# Patient Record
Sex: MALE | Race: WHITE | NOT HISPANIC OR LATINO | Employment: FULL TIME | URBAN - METROPOLITAN AREA
[De-identification: names, ages, dates, MRNs, and addresses within clinical notes are randomized per-mention and may not be internally consistent; named-entity substitution may affect disease eponyms.]

---

## 2018-09-17 ENCOUNTER — HOSPITAL ENCOUNTER (EMERGENCY)
Facility: CLINIC | Age: 28
Discharge: HOME | End: 2018-09-17
Attending: EMERGENCY MEDICINE | Admitting: EMERGENCY MEDICINE
Payer: COMMERCIAL

## 2018-09-17 ENCOUNTER — APPOINTMENT (OUTPATIENT)
Dept: GENERAL RADIOLOGY | Facility: CLINIC | Age: 28
End: 2018-09-17
Attending: EMERGENCY MEDICINE
Payer: COMMERCIAL

## 2018-09-17 VITALS
SYSTOLIC BLOOD PRESSURE: 128 MMHG | TEMPERATURE: 97.8 F | WEIGHT: 112.43 LBS | DIASTOLIC BLOOD PRESSURE: 71 MMHG | BODY MASS INDEX: 17.04 KG/M2 | HEIGHT: 68 IN | HEART RATE: 88 BPM | RESPIRATION RATE: 18 BRPM | OXYGEN SATURATION: 98 %

## 2018-09-17 DIAGNOSIS — Q21.3 TETRALOGY OF FALLOT: ICD-10-CM

## 2018-09-17 DIAGNOSIS — R07.9 ACUTE CHEST PAIN: ICD-10-CM

## 2018-09-17 LAB
ALBUMIN SERPL-MCNC: 4.5 G/DL (ref 3.4–5)
ALP SERPL-CCNC: 68 U/L (ref 40–150)
ALT SERPL W P-5'-P-CCNC: 23 U/L (ref 0–70)
ANION GAP SERPL CALCULATED.3IONS-SCNC: 8 MMOL/L (ref 3–14)
AST SERPL W P-5'-P-CCNC: 20 U/L (ref 0–45)
BASOPHILS # BLD AUTO: 0 10E9/L (ref 0–0.2)
BASOPHILS NFR BLD AUTO: 0.4 %
BILIRUB SERPL-MCNC: 0.6 MG/DL (ref 0.2–1.3)
BUN SERPL-MCNC: 13 MG/DL (ref 7–30)
CALCIUM SERPL-MCNC: 8.6 MG/DL (ref 8.5–10.1)
CHLORIDE SERPL-SCNC: 107 MMOL/L (ref 94–109)
CO2 SERPL-SCNC: 26 MMOL/L (ref 20–32)
CREAT SERPL-MCNC: 1.05 MG/DL (ref 0.66–1.25)
DIFFERENTIAL METHOD BLD: NORMAL
EOSINOPHIL # BLD AUTO: 0.1 10E9/L (ref 0–0.7)
EOSINOPHIL NFR BLD AUTO: 1.4 %
ERYTHROCYTE [DISTWIDTH] IN BLOOD BY AUTOMATED COUNT: 12.7 % (ref 10–15)
GFR SERPL CREATININE-BSD FRML MDRD: 86 ML/MIN/1.7M2
GLUCOSE SERPL-MCNC: 95 MG/DL (ref 70–99)
HCT VFR BLD AUTO: 47 % (ref 40–53)
HGB BLD-MCNC: 15.4 G/DL (ref 13.3–17.7)
IMM GRANULOCYTES # BLD: 0 10E9/L (ref 0–0.4)
IMM GRANULOCYTES NFR BLD: 0.1 %
INTERPRETATION ECG - MUSE: NORMAL
LYMPHOCYTES # BLD AUTO: 1.5 10E9/L (ref 0.8–5.3)
LYMPHOCYTES NFR BLD AUTO: 21 %
MCH RBC QN AUTO: 28.5 PG (ref 26.5–33)
MCHC RBC AUTO-ENTMCNC: 32.8 G/DL (ref 31.5–36.5)
MCV RBC AUTO: 87 FL (ref 78–100)
MONOCYTES # BLD AUTO: 0.7 10E9/L (ref 0–1.3)
MONOCYTES NFR BLD AUTO: 9.3 %
NEUTROPHILS # BLD AUTO: 5 10E9/L (ref 1.6–8.3)
NEUTROPHILS NFR BLD AUTO: 67.8 %
NRBC # BLD AUTO: 0 10*3/UL
NRBC BLD AUTO-RTO: 0 /100
PLATELET # BLD AUTO: 198 10E9/L (ref 150–450)
POTASSIUM SERPL-SCNC: 3.8 MMOL/L (ref 3.4–5.3)
PROT SERPL-MCNC: 7 G/DL (ref 6.8–8.8)
RBC # BLD AUTO: 5.41 10E12/L (ref 4.4–5.9)
SODIUM SERPL-SCNC: 141 MMOL/L (ref 133–144)
TROPONIN I SERPL-MCNC: <0.015 UG/L (ref 0–0.04)
WBC # BLD AUTO: 7.3 10E9/L (ref 4–11)

## 2018-09-17 PROCEDURE — 93005 ELECTROCARDIOGRAM TRACING: CPT

## 2018-09-17 PROCEDURE — 99285 EMERGENCY DEPT VISIT HI MDM: CPT | Mod: 25

## 2018-09-17 PROCEDURE — 80053 COMPREHEN METABOLIC PANEL: CPT | Performed by: EMERGENCY MEDICINE

## 2018-09-17 PROCEDURE — 25000132 ZZH RX MED GY IP 250 OP 250 PS 637: Performed by: EMERGENCY MEDICINE

## 2018-09-17 PROCEDURE — 85025 COMPLETE CBC W/AUTO DIFF WBC: CPT | Performed by: EMERGENCY MEDICINE

## 2018-09-17 PROCEDURE — 84484 ASSAY OF TROPONIN QUANT: CPT | Performed by: EMERGENCY MEDICINE

## 2018-09-17 PROCEDURE — 71046 X-RAY EXAM CHEST 2 VIEWS: CPT

## 2018-09-17 RX ORDER — ASPIRIN 81 MG/1
81 TABLET ORAL
COMMUNITY

## 2018-09-17 RX ORDER — ASPIRIN 81 MG/1
324 TABLET, CHEWABLE ORAL ONCE
Status: COMPLETED | OUTPATIENT
Start: 2018-09-17 | End: 2018-09-17

## 2018-09-17 RX ORDER — ATENOLOL 25 MG/1
37.5 TABLET ORAL
COMMUNITY
Start: 2018-03-22

## 2018-09-17 RX ADMIN — ASPIRIN 81 MG 243 MG: 81 TABLET ORAL at 05:30

## 2018-09-17 ASSESSMENT — ENCOUNTER SYMPTOMS
CONSTIPATION: 0
SHORTNESS OF BREATH: 0
VOMITING: 0
DIARRHEA: 0
DYSURIA: 0
ABDOMINAL PAIN: 0
COUGH: 0
HEMATURIA: 0
NAUSEA: 0
FREQUENCY: 0
DIFFICULTY URINATING: 0
APPETITE CHANGE: 0

## 2018-09-17 NOTE — ED PROVIDER NOTES
History     Chief Complaint:  Chest Pain    HPI   Dominic Gibbs is a 27 year old male with a history of tetralogy of Fallot, RBBB who presents to the emergency department for evaluation of chest pain. The patient reports he awoke this morning around 0430 due to left lower chest pain that migrated upwards. He indicates this episode lasted about 1 second but he experienced it 10-15 times, also experienced it in triage. He states he has not experienced it for the last 90 minutes here at the ED. The patient remarks this pain is new and denies no history of the same. He further denies any other symptoms, including shortness of breath, urinary symptoms, bowel symptoms, symptoms with eating. He does note he had a cough when he awoke this morning but that that has since resolved. The patient also reports he had an appointment for his tetralogy of Fallot in March and was told all was normal.    Allergies:  Norcuron     Medications:    Atenolol  Aspirin     Past Medical History:    Tremor  Pulmonary stenosis  RBBB  Reactive airway disease  Scoliosis  Tetralogy of Fallot    Past Surgical History:    Right ventricle to pulmonary artery outflow patch  Back surgery, michael placement for scoliosis  Multiple heart catheterizations  Right ventricle to pulmonary artery conduit change    Family History:    No past pertinent family history.    Social History:  Presents alone.  Never smoker.  Negative for alcohol use.  Marital Status:  Single [1]     Review of Systems   Constitutional: Negative for appetite change.   Respiratory: Negative for cough and shortness of breath.    Cardiovascular: Positive for chest pain.   Gastrointestinal: Negative for abdominal pain, constipation, diarrhea, nausea and vomiting.   Genitourinary: Negative for difficulty urinating, dysuria, frequency and hematuria.   All other systems reviewed and are negative.    Physical Exam     Patient Vitals for the past 24 hrs:   BP Temp Temp src Pulse Resp SpO2 Height  "Weight   09/17/18 0630 128/71 - - - - 98 % - -   09/17/18 0621 127/68 - - - - 99 % - -   09/17/18 0530 130/71 - - - - 100 % - -   09/17/18 0514 126/76 97.8  F (36.6  C) Temporal 88 18 100 % 1.727 m (5' 8\") 51 kg (112 lb 7 oz)       Physical Exam  General: The patient is alert, in no respiratory distress.    HENT: Mucous membranes moist.    Cardiovascular: Regular rate and rhythm. Good pulses in all four extremities. Normal capillary refill and skin turgor. Split S2. Systolic murmur.    Respiratory: Lungs are clear. No nasal flaring. No retractions. No wheezing, no crackles.    Gastrointestinal: Abdomen soft. No guarding, no rebound. No palpable hernias.     Musculoskeletal: No gross deformity.     Skin: No rashes or petechiae.     Neurologic: The patient is alert and oriented x3. GCS 15. No testable cranial nerve deficit. Follows commands with clear and appropriate speech. Gives appropriate answers. Good strength in all extremities. No gross neurologic deficit. Gross sensation intact. Pupils are round and reactive. No meningismus.     Lymphatic: No cervical adenopathy. No lower extremity swelling.    Psychiatric: The patient is non-tearful.    Emergency Department Course   ECG:  Time: 0624  Vent. Rate 71 bpm. NM interval 146. QRS duration 156. QT/QTc 434/471. P-R-T axis 46 93 73. Sinus rhythm with frequent premature ventricular complexes. RBBB. Similar to 2/6/06. Read time: 0626    Imaging:  Radiographic findings were communicated with the patient who voiced understanding of the findings.    XR Chest 2 views:   1. No acute pulmonary abnormality.  2. The inferior most sternal suture has fractured in comparison with  2007. As per radiology.     Laboratory:  CBC: WBC: 7.3, HGB: 15.4, PLT: 198  CMP: all WNL (Creatinine: 1.05)    0528 Troponin I: <0.015    Interventions:  0530 Aspirin 324 mg PO    Emergency Department Course:  Nursing notes and vitals reviewed.     EKG obtained in the ED, see results above.     IV " "inserted. Medicine administered as documented above. Blood drawn. This was sent to the lab for further testing, results above.    The patient was sent for a XR Chest while in the emergency department, findings above.     0630 I performed an exam of the patient as documented above. I disuccused the results of his workup thus far.     Findings and plan explained to the Patient. Patient discharged home with instructions regarding supportive care, medications, and reasons to return. The importance of close follow-up was reviewed.     I personally reviewed the laboratory results with the Patient and answered all related questions prior to discharge.     Impression & Plan      Medical Decision Making:  Dominic Gibbs is a 27 year old male who presents with a complaint of chest pain which is very atypical. He states it \"does not even hurt.\" There is a discrete area near the left nipple line with pain that will travel from the lower chest to the upper chest in one discrete line, and then dissolve. He has no associated symptoms with it. It has started recently. This does not sound consistent with a cardiac cause, though he has a history of tetralogy of Fallot, status post repair. He does not look fluid overloaded. He does have some pulmonary stenosis noted in the chart, but I do not feel this indicates dissection. He did have a broken sternal wire, but I do not feel this was likely the cause behind this. Interestingly, the patient did have an episode while an EKG was performed, and there is a narrow PVCs there, and I wonder if that is what he is sensing. We discussed inflammatory conditions like pleurisy or muscle train are more likely. We discussed PE at length, and the decision was made with the patient, due to his lower risk, of not performing a CT. I also did not feel this was likely dissection. He is otherwise stable. His screen troponin is normal. His EKG does not show signs of significant change, and I think he is " otherwise appropriate to call his cardiologist as an outpatient. He would likely want to do further imaging, echocardiogram, but I will leave further workup to them. I do not believe there is an emergent condition going on currently, or cardiac ischemia. He was discharged home in good condition.     Diagnosis:    ICD-10-CM   1. Acute chest pain R07.9   2. Tetralogy of Fallot Q21.3       Disposition:  discharged to home    I, Alex Feng, am serving as a scribe on 9/17/2018 at 6:26 AM to personally document services performed by Asaf Newsome MD based on my observations and the provider's statements to me.     Alex Feng  9/17/2018   [facility] EMERGENCY DEPARTMENT        Asaf Newsome MD  09/17/18 0916

## 2018-09-17 NOTE — ED TRIAGE NOTES
Pt with CP that woke pt up from sleep.  Hx of ToF as a child, complex cardiac hx.  Denies nausea, endorses mild SOB.

## 2018-09-17 NOTE — ED AVS SNAPSHOT
[facility] Emergency Department    [address]    [facility] [address]    Phone:  [phone]    Fax:  [phone]                                       Dominic Gibbs   MRN: [id number]    Department:  [facility] Emergency Department   Date of Visit:  9/17/2018           After Visit Summary Signature Page     I have received my discharge instructions, and my questions have been answered. I have discussed any challenges I see with this plan with the nurse or doctor.    ..........................................................................................................................................  Patient/Patient Representative Signature      ..........................................................................................................................................  Patient Representative Print Name and Relationship to Patient    ..................................................               ................................................  Date                                   Time    ..........................................................................................................................................  Reviewed by Signature/Title    ...................................................              ..............................................  Date                                               Time          22EPIC Rev 08/18

## 2018-09-17 NOTE — ED AVS SNAPSHOT
[facility] Emergency Department    [address]    [facility] [address]    Phone:  [phone]    Fax:  [phone]                                       Dominic Gibbs   MRN: [id number]    Department:  [facility] Emergency Department   Date of Visit:  9/17/2018           Patient Information     Date Of Birth          1990        Your diagnoses for this visit were:     Acute chest pain     Tetralogy of Fallot        You were seen by Freddie Whitman MD and Asaf Newsome MD.      Follow-up Information     Follow up with your cardiologist. Call today.        Follow up with your doctor. Schedule an appointment as soon as possible for a visit in 3 days.        Discharge Instructions       Discharge Instructions  Chest Pain    You have been seen today for chest pain or discomfort.  At this time, your doctor has found no signs that your chest pain is due to a serious or life-threatening condition, (or you have declined more testing and/or admission to the hospital). However, sometimes there is a serious problem that does not show up right away. Your evaluation today may not be complete and you may need further testing and evaluation.     You need to follow-up with your regular doctor within 3 days.    Return to the Emergency Department if:    Your chest pain changes, gets worse, starts to happen more often, or comes with less activity.    You are short of breath.    You get very weak or tired.    You pass out or faint.    You have any new symptoms, like fever, cough, numb legs, or you cough up blood.    You have anything else that worries you.    Until you follow-up with your regular doctor please do the following:    Take one aspirin daily unless you have an allergy or are told not to by your doctor.    If a stress test appointment has been made, go to the appointment.    If you have questions, contact your regular doctor.    If your doctor today has told you to  follow-up with your regular doctor, it is very important that you make an appointment with your clinic and go to the appointment.  If you do not follow-up with your primary doctor, it may result in missing an important development which could result in permanent injury or disability and/or lasting pain.  If there is any problem keeping your appointment, call your doctor or return to the Emergency Department.    If you were given a prescription for medicine here today, be sure to read all of the information (including the package insert) that comes with your prescription.  This will include important information about the medicine, its side effects, and any warnings that you need to know about.  The pharmacist who fills the prescription can provide more information and answer questions you may have about the medicine.  If you have questions or concerns that the pharmacist cannot address, please call or return to the Emergency Department.     Opioid Medication Information    Pain medications are among the most commonly prescribed medicines, so we are including this information for all our patients. If you did not receive pain medication or get a prescription for pain medicine, you can ignore it.     You may have been given a prescription for an opioid (narcotic) pain medicine and/or have received a pain medicine while here in the Emergency Department. These medicines can make you drowsy or impaired. You must not drive, operate dangerous equipment, or engage in any other dangerous activities while taking these medications. If you drive while taking these medications, you could be arrested for DUI, or driving under the influence. Do not drink any alcohol while you are taking these medications.     Opioid pain medications can cause addiction. If you have a history of chemical dependency of any type, you are at a higher risk of becoming addicted to pain medications.  Only take these prescribed medications to treat your  pain when all other options have been tried. Take it for as short a time and as few doses as possible. Store your pain pills in a secure place, as they are frequently stolen and provide a dangerous opportunity for children or visitors in your house to start abusing these powerful medications. We will not replace any lost or stolen medicine.  As soon as your pain is better, you should flush all your remaining medication.     Many prescription pain medications contain Tylenol  (acetaminophen), including Vicodin , Tylenol #3 , Norco , Lortab , and Percocet .  You should not take any extra pills of Tylenol  if you are using these prescription medications or you can get very sick.  Do not ever take more than 3000 mg of acetaminophen in any 24 hour period.    All opioids tend to cause constipation. Drink plenty of water and eat foods that have a lot of fiber, such as fruits, vegetables, prune juice, apple juice and high fiber cereal.  Take a laxative if you don t move your bowels at least every other day. Miralax , Milk of Magnesia, Colace , or Senna  can be used to keep you regular.      Remember that you can always come back to the Emergency Department if you are not able to see your regular doctor in the amount of time listed above, if you get any new symptoms, or if there is anything that worries you.          24 Hour Appointment Hotline       To make an appointment at any [facility], call [phone] ([phone]). If you don't have a family doctor or clinic, we will help you find one. [facility] are conveniently located to serve the needs of you and your family.             Review of your medicines      Our records show that you are taking the medicines listed below. If these are incorrect, please call your family doctor or clinic.        Dose / Directions Last dose taken    aspirin 81 MG EC tablet   Dose:  81 mg        Take 81 mg by mouth   Refills:  0        atenolol 25 MG tablet   Commonly known  as:  TENORMIN   Dose:  37.5 mg        Take 37.5 mg by mouth   Refills:  0                Procedures and tests performed during your visit     CBC with platelets differential    Cardiac Continuous Monitoring    Comprehensive metabolic panel    EKG 12 lead    Peripheral IV: Standard    Pulse oximetry nursing    Troponin I    XR Chest 2 Views      Orders Needing Specimen Collection     None      Pending Results     Date and Time Order Name Status Description    9/17/2018 0516 EKG 12 lead Preliminary             Pending Culture Results     No orders found from 9/15/2018 to 9/18/2018.            Pending Results Instructions     If you had any lab results that were not finalized at the time of your Discharge, you can call the ED Lab Result RN at [phone]. You will be contacted by this team for any positive Lab results or changes in treatment. The nurses are available 7 days a week from 10A to 6:30P.  You can leave a message 24 hours per day and they will return your call.        Test Results From Your Hospital Stay        9/17/2018  5:33 AM      Component Results     Component Value Ref Range & Units Status    WBC 7.3 4.0 - 11.0 10e9/L Final    RBC Count 5.41 4.4 - 5.9 10e12/L Final    Hemoglobin 15.4 13.3 - 17.7 g/dL Final    Hematocrit 47.0 40.0 - 53.0 % Final    MCV 87 78 - 100 fl Final    MCH 28.5 26.5 - 33.0 pg Final    MCHC 32.8 31.5 - 36.5 g/dL Final    RDW 12.7 10.0 - 15.0 % Final    Platelet Count 198 150 - 450 10e9/L Final    Diff Method Automated Method  Final    % Neutrophils 67.8 % Final    % Lymphocytes 21.0 % Final    % Monocytes 9.3 % Final    % Eosinophils 1.4 % Final    % Basophils 0.4 % Final    % Immature Granulocytes 0.1 % Final    Nucleated RBCs 0 0 /100 Final    Absolute Neutrophil 5.0 1.6 - 8.3 10e9/L Final    Absolute Lymphocytes 1.5 0.8 - 5.3 10e9/L Final    Absolute Monocytes 0.7 0.0 - 1.3 10e9/L Final    Absolute Eosinophils 0.1 0.0 - 0.7 10e9/L Final    Absolute Basophils 0.0 0.0 - 0.2  10e9/L Final    Abs Immature Granulocytes 0.0 0 - 0.4 10e9/L Final    Absolute Nucleated RBC 0.0  Final         9/17/2018  5:55 AM      Component Results     Component Value Ref Range & Units Status    Troponin I ES <0.015 0.000 - 0.045 ug/L Final    The 99th percentile for upper reference range is 0.045 ug/L.  Troponin values   in the range of 0.045 - 0.120 ug/L may be associated with risks of adverse   clinical events.           9/17/2018  5:55 AM      Component Results     Component Value Ref Range & Units Status    Sodium 141 133 - 144 mmol/L Final    Potassium 3.8 3.4 - 5.3 mmol/L Final    Chloride 107 94 - 109 mmol/L Final    Carbon Dioxide 26 20 - 32 mmol/L Final    Anion Gap 8 3 - 14 mmol/L Final    Glucose 95 70 - 99 mg/dL Final    Urea Nitrogen 13 7 - 30 mg/dL Final    Creatinine 1.05 0.66 - 1.25 mg/dL Final    GFR Estimate 86 >60 mL/min/1.7m2 Final    Non  GFR Calc    GFR Estimate If Black >90 >60 mL/min/1.7m2 Final    African American GFR Calc    Calcium 8.6 8.5 - 10.1 mg/dL Final    Bilirubin Total 0.6 0.2 - 1.3 mg/dL Final    Albumin 4.5 3.4 - 5.0 g/dL Final    Protein Total 7.0 6.8 - 8.8 g/dL Final    Alkaline Phosphatase 68 40 - 150 U/L Final    ALT 23 0 - 70 U/L Final    AST 20 0 - 45 U/L Final         9/17/2018  5:50 AM      Narrative     XR CHEST 2 VW 9/17/2018 5:44 AM    HISTORY: Chest pain.    COMPARISON: 8/20/2007    FINDINGS: No airspace consolidation, pleural effusion or pneumothorax.  Thoracolumbar stabilization hardware and mediastinal hardware noted.  In comparison with 2007 the inferior most sternal suture has  fractured.        Impression     IMPRESSION:   1. No acute pulmonary abnormality.  2. The inferior most sternal suture has fractured in comparison with  2007.    NAMITA HOANG MD                Clinical Quality Measure: Blood Pressure Screening     Your blood pressure was checked while you were in the emergency department today. The last reading we obtained was  BP:  "127/68 . Please read the guidelines below about what these numbers mean and what you should do about them.  If your systolic blood pressure (the top number) is less than 120 and your diastolic blood pressure (the bottom number) is less than 80, then your blood pressure is normal. There is nothing more that you need to do about it.  If your systolic blood pressure (the top number) is 120-139 or your diastolic blood pressure (the bottom number) is 80-89, your blood pressure may be higher than it should be. You should have your blood pressure rechecked within a year by a primary care provider.  If your systolic blood pressure (the top number) is 140 or greater or your diastolic blood pressure (the bottom number) is 90 or greater, you may have high blood pressure. High blood pressure is treatable, but if left untreated over time it can put you at risk for heart attack, stroke, or kidney failure. You should have your blood pressure rechecked by a primary care provider within the next 4 weeks.  If your provider in the emergency department today gave you specific instructions to follow-up with your doctor or provider even sooner than that, you should follow that instruction and not wait for up to 4 weeks for your follow-up visit.        Thank you for choosing [facility]       Thank you for choosing [facility] for your care. Our goal is always to provide you with excellent care. Hearing back from our patients is one way we can continue to improve our services. Please take a few minutes to complete the written survey that you may receive in the mail after you visit with us. Thank you!        [employer] Information     [employer] lets you send messages to your doctor, view your test results, renew your prescriptions, schedule appointments and more. To sign up, go to [email or web address] . Click on \"Log in\" on the left side of the screen, which will take you to the Welcome page. Then click on \"Sign up Now\" on the right side of the " page.     You will be asked to enter the access code listed below, as well as some personal information. Please follow the directions to create your username and password.     Your access code is: [email or web address]  Expires: 2018  6:36 AM     Your access code will  in 90 days. If you need help or a new code, please call your [place] clinic or [phone].        Care EveryWhere ID     This is your Care EveryWhere ID. This could be used by other organizations to access your [place] medical records  [id number]        Equal Access to Services     [facility]: Suleiman Parisi, maddie alvarez, greg padilla, rafy jain . So [facility] [phone].    ATENCIÓN: Si habla español, tiene a moreland disposición servicios gratuitos de asistencia lingüística. Llame al [phone].    We comply with applicable federal civil rights laws and [place] laws. We do not discriminate on the basis of race, color, national origin, age, disability, sex, sexual orientation, or gender identity.            After Visit Summary       This is your record. Keep this with you and show to your community pharmacist(s) and doctor(s) at your next visit.

## 2022-02-02 ENCOUNTER — VIRTUAL VISIT (OUTPATIENT)
Dept: UROLOGY | Facility: CLINIC | Age: 32
End: 2022-02-02
Payer: COMMERCIAL

## 2022-02-02 DIAGNOSIS — Z30.09 VASECTOMY EVALUATION: Primary | ICD-10-CM

## 2022-02-02 PROCEDURE — 99203 OFFICE O/P NEW LOW 30 MIN: CPT | Mod: 95 | Performed by: UROLOGY

## 2022-02-02 NOTE — LETTER
2/2/2022       RE: Dominic Gibbs  [address] [place] [address]     Dear Colleague,    Thank you for referring your patient, Dominic Gibbs, to the [facility] at [facility]. Please see a copy of my visit note below.    VASECTOMY CONSULTATION NOTE  DATE OF VISIT: 2/2/2022  [facility]  PATIENT NAME: Dominic Gibbs    YOB: 1990      REASON FOR CONSULTATION: Mr. Dominic Gibbs is a 31 year old year old gentleman who is seen today requesting a vasectomy. He has 0 children - and he wishes to have a vasectomy for birth control. His wife is in agreement with this plan.     PAST MEDICAL HISTORY:   Past Medical History:   Diagnosis Date     Benign essential tremor      Pulmonary stenosis      RBBB      Reactive airway disease      Scoliosis     s/p surgical repair     Tetralogy of Fallot s/p repair        PAST SURGICAL HISTORY:   Past Surgical History:   Procedure Laterality Date      Right ventricle to pulmonary artery outflow patch.  12/1992     BACK SURGERY - Jose placement for scoliosis  2009     Multiple heart catheterizations  1992, 1993, 1997, 8/2012     Right ventricle to pulmonary artery conduit change (with 18 mm Chavez Dacron porcine valved conduit), suture closure of residual VSD  06/2006     Status post 18 mm pulmonary homograft in the right ventricular outflow tract with VSD closure.  04/1994       MEDICATIONS:   Current Outpatient Medications:      aspirin 81 MG EC tablet, Take 81 mg by mouth, Disp: , Rfl:      atenolol (TENORMIN) 25 MG tablet, Take 37.5 mg by mouth, Disp: , Rfl:     ALLERGIES:   Allergies   Allergen Reactions     Norcuron Anaphylaxis       FAMILY HISTORY: No family history on file.    SOCIAL HISTORY:   Social History     Socioeconomic History     Marital status: Single     Spouse name: Not on file     Number of children: Not on file     Years of education: Not on file     Highest education  level: Not on file   Occupational History     Not on file   Tobacco Use     Smoking status: Never Smoker     Smokeless tobacco: Not on file   Substance and Sexual Activity     Alcohol use: No     Drug use: No     Sexual activity: Not on file   Other Topics Concern     Not on file   Social History Narrative     Not on file     Social Determinants of Health     Financial Resource Strain: Not on file   Food Insecurity: Not on file   Transportation Needs: Not on file   Physical Activity: Not on file   Stress: Not on file   Social Connections: Not on file   Intimate Partner Violence: Not on file   Housing Stability: Not on file       PHYSICAL EXAM  Patient is a 31 year old  male   Vitals: There were no vitals taken for this visit.  There is no height or weight on file to calculate BMI.  General Appearance Adult:   Alert, no acute distress, oriented  HENT: throat/mouth:normal, good dentition  Lungs: no respiratory distress, or pursed lip breathing  Heart: No obvious jugular venous distension present  Abdomen: non - distended  Musculoskeltal: extremities normal, no peripheral edema  Skin: no suspicious lesions or rashes  Neuro: Alert, oriented, speech and mentation normal  Psych: affect and mood normal    DIAGNOSIS: Request for sterilization    PLAN: The risks of the procedure as well as expectations for recovery and outcomes were explained in detail to him.  He was counseled on the risks for bleeding infection and pain after the procedure. We discussed the risk of post-vasectomy pain syndrome.  He was instructed to continue to use contraception until he had proven azoospermia on a semen specimen.  This would normally be collected at least 3 months after the procedure. Also discussed the rare, but possible risk of re-canalization of the vas, even after successful vasectomy with sterile semen specimen.  He was instructed to hold all anticoagulants medications for one week prior to the procedure.  It was recommended that he  have someone else drive him home after his vasectomy.  In light of these risks and expectations he would like to proceed.  We are scheduling a vasectomy in the office in the near future.    Pt. Understands:  -1/1000-1/3000 risk of future pregnancy even with perfectly done vasectomy  -vasectomy is a permanent procedure    -he may cryopreserve sperm if he wishes   -1-5% risk of post-vasectomy pain syndrome   -1-5% risk of complication, primarily infection or bleeding  - he needs to have a semen sample that shows no sperm before getting approval for unprotected intercourse.      Thank you for the kind consultation.    Time spent: 15 minutes of which >50% was spent counseling.    Constantin Maria MD   Urology  [facility]  Clinic Phone [phone]      Dominic Gibbs  who is being evaluated via a billable video visit.      How would you like to obtain your AVS? MyChart  If the video visit is dropped, the invitation should be resent by: Send to e-mail at: [email or web address]  Will anyone else be joining your video visit? No    Video-Visit Details    Type of service:  Video Visit    Video Start Time: 3:27 PM    Video End Time:3:34 PM    Originating Location (pt. Location): Home    Distant Location (provider location):  [facility]     Platform used for Video Visit: [employer]

## 2022-02-02 NOTE — PROGRESS NOTES
VASECTOMY CONSULTATION NOTE  DATE OF VISIT: 2/2/2022  [facility]  PATIENT NAME: Dominic Gibbs    YOB: 1990      REASON FOR CONSULTATION: Mr. Dominic Gibbs is a 31 year old year old gentleman who is seen today requesting a vasectomy. He has 0 children - and he wishes to have a vasectomy for birth control. His wife is in agreement with this plan.     PAST MEDICAL HISTORY:   Past Medical History:   Diagnosis Date     Benign essential tremor      Pulmonary stenosis      RBBB      Reactive airway disease      Scoliosis     s/p surgical repair     Tetralogy of Fallot s/p repair        PAST SURGICAL HISTORY:   Past Surgical History:   Procedure Laterality Date      Right ventricle to pulmonary artery outflow patch.  12/1992     BACK SURGERY - Jose placement for scoliosis  2009     Multiple heart catheterizations  1992, 1993, 1997, 8/2012     Right ventricle to pulmonary artery conduit change (with 18 mm Chavez Dacron porcine valved conduit), suture closure of residual VSD  06/2006     Status post 18 mm pulmonary homograft in the right ventricular outflow tract with VSD closure.  04/1994       MEDICATIONS:   Current Outpatient Medications:      aspirin 81 MG EC tablet, Take 81 mg by mouth, Disp: , Rfl:      atenolol (TENORMIN) 25 MG tablet, Take 37.5 mg by mouth, Disp: , Rfl:     ALLERGIES:   Allergies   Allergen Reactions     Norcuron Anaphylaxis       FAMILY HISTORY: No family history on file.    SOCIAL HISTORY:   Social History     Socioeconomic History     Marital status: Single     Spouse name: Not on file     Number of children: Not on file     Years of education: Not on file     Highest education level: Not on file   Occupational History     Not on file   Tobacco Use     Smoking status: Never Smoker     Smokeless tobacco: Not on file   Substance and Sexual Activity     Alcohol use: No     Drug use: No     Sexual activity: Not on file   Other Topics Concern     Not on file   Social History Narrative      Not on file     Social Determinants of Health     Financial Resource Strain: Not on file   Food Insecurity: Not on file   Transportation Needs: Not on file   Physical Activity: Not on file   Stress: Not on file   Social Connections: Not on file   Intimate Partner Violence: Not on file   Housing Stability: Not on file       PHYSICAL EXAM  Patient is a 31 year old  male   Vitals: There were no vitals taken for this visit.  There is no height or weight on file to calculate BMI.  General Appearance Adult:   Alert, no acute distress, oriented  HENT: throat/mouth:normal, good dentition  Lungs: no respiratory distress, or pursed lip breathing  Heart: No obvious jugular venous distension present  Abdomen: non - distended  Musculoskeltal: extremities normal, no peripheral edema  Skin: no suspicious lesions or rashes  Neuro: Alert, oriented, speech and mentation normal  Psych: affect and mood normal    DIAGNOSIS: Request for sterilization    PLAN: The risks of the procedure as well as expectations for recovery and outcomes were explained in detail to him.  He was counseled on the risks for bleeding infection and pain after the procedure. We discussed the risk of post-vasectomy pain syndrome.  He was instructed to continue to use contraception until he had proven azoospermia on a semen specimen.  This would normally be collected at least 3 months after the procedure. Also discussed the rare, but possible risk of re-canalization of the vas, even after successful vasectomy with sterile semen specimen.  He was instructed to hold all anticoagulants medications for one week prior to the procedure.  It was recommended that he have someone else drive him home after his vasectomy.  In light of these risks and expectations he would like to proceed.  We are scheduling a vasectomy in the office in the near future.    Pt. Understands:  -1/1000-1/3000 risk of future pregnancy even with perfectly done vasectomy  -vasectomy is a  permanent procedure    -he may cryopreserve sperm if he wishes   -1-5% risk of post-vasectomy pain syndrome   -1-5% risk of complication, primarily infection or bleeding  - he needs to have a semen sample that shows no sperm before getting approval for unprotected intercourse.      Thank you for the kind consultation.    Time spent: 15 minutes of which >50% was spent counseling.    Constantin Maria MD   Urology  [facility]  Clinic Phone [phone]      Dominic Gibbs  who is being evaluated via a billable video visit.      How would you like to obtain your AVS? MyChart  If the video visit is dropped, the invitation should be resent by: Send to e-mail at: [email or web address]  Will anyone else be joining your video visit? No    Video-Visit Details    Type of service:  Video Visit    Video Start Time: 3:27 PM    Video End Time:3:34 PM    Originating Location (pt. Location): Home    Distant Location (provider location):  [facility]     Platform used for Video Visit: CEL-SCI

## 2022-03-11 ENCOUNTER — HOSPITAL ENCOUNTER (EMERGENCY)
Facility: CLINIC | Age: 32
Discharge: HOME | End: 2022-03-11
Attending: EMERGENCY MEDICINE | Admitting: EMERGENCY MEDICINE
Payer: COMMERCIAL

## 2022-03-11 VITALS
DIASTOLIC BLOOD PRESSURE: 79 MMHG | HEART RATE: 85 BPM | SYSTOLIC BLOOD PRESSURE: 135 MMHG | RESPIRATION RATE: 20 BRPM | TEMPERATURE: 98 F | OXYGEN SATURATION: 98 %

## 2022-03-11 DIAGNOSIS — L03.211 CELLULITIS OF FACE: ICD-10-CM

## 2022-03-11 LAB
ANION GAP SERPL CALCULATED.3IONS-SCNC: 4 MMOL/L (ref 3–14)
BASOPHILS # BLD AUTO: 0 10E3/UL (ref 0–0.2)
BASOPHILS NFR BLD AUTO: 0 %
BUN SERPL-MCNC: 9 MG/DL (ref 7–30)
CALCIUM SERPL-MCNC: 9 MG/DL (ref 8.5–10.1)
CHLORIDE BLD-SCNC: 109 MMOL/L (ref 94–109)
CO2 SERPL-SCNC: 27 MMOL/L (ref 20–32)
CREAT SERPL-MCNC: 1.13 MG/DL (ref 0.66–1.25)
EOSINOPHIL # BLD AUTO: 0.1 10E3/UL (ref 0–0.7)
EOSINOPHIL NFR BLD AUTO: 1 %
ERYTHROCYTE [DISTWIDTH] IN BLOOD BY AUTOMATED COUNT: 12.9 % (ref 10–15)
FLUAV RNA SPEC QL NAA+PROBE: NEGATIVE
FLUBV RNA RESP QL NAA+PROBE: NEGATIVE
GFR SERPL CREATININE-BSD FRML MDRD: 90 ML/MIN/1.73M2
GLUCOSE BLD-MCNC: 94 MG/DL (ref 70–99)
HCT VFR BLD AUTO: 44.4 % (ref 40–53)
HGB BLD-MCNC: 14.3 G/DL (ref 13.3–17.7)
IMM GRANULOCYTES # BLD: 0 10E3/UL
IMM GRANULOCYTES NFR BLD: 0 %
LYMPHOCYTES # BLD AUTO: 0.9 10E3/UL (ref 0.8–5.3)
LYMPHOCYTES NFR BLD AUTO: 9 %
MCH RBC QN AUTO: 27.6 PG (ref 26.5–33)
MCHC RBC AUTO-ENTMCNC: 32.2 G/DL (ref 31.5–36.5)
MCV RBC AUTO: 86 FL (ref 78–100)
MONOCYTES # BLD AUTO: 0.9 10E3/UL (ref 0–1.3)
MONOCYTES NFR BLD AUTO: 9 %
NEUTROPHILS # BLD AUTO: 8.3 10E3/UL (ref 1.6–8.3)
NEUTROPHILS NFR BLD AUTO: 81 %
NRBC # BLD AUTO: 0 10E3/UL
NRBC BLD AUTO-RTO: 0 /100
PLATELET # BLD AUTO: 202 10E3/UL (ref 150–450)
POTASSIUM BLD-SCNC: 4.6 MMOL/L (ref 3.4–5.3)
RBC # BLD AUTO: 5.18 10E6/UL (ref 4.4–5.9)
SARS-COV-2 RNA RESP QL NAA+PROBE: NEGATIVE
SODIUM SERPL-SCNC: 140 MMOL/L (ref 133–144)
WBC # BLD AUTO: 10.3 10E3/UL (ref 4–11)

## 2022-03-11 PROCEDURE — 85004 AUTOMATED DIFF WBC COUNT: CPT | Performed by: EMERGENCY MEDICINE

## 2022-03-11 PROCEDURE — 36415 COLL VENOUS BLD VENIPUNCTURE: CPT | Performed by: EMERGENCY MEDICINE

## 2022-03-11 PROCEDURE — C9803 HOPD COVID-19 SPEC COLLECT: HCPCS

## 2022-03-11 PROCEDURE — 99284 EMERGENCY DEPT VISIT MOD MDM: CPT | Mod: 25

## 2022-03-11 PROCEDURE — 96365 THER/PROPH/DIAG IV INF INIT: CPT

## 2022-03-11 PROCEDURE — 82310 ASSAY OF CALCIUM: CPT | Performed by: EMERGENCY MEDICINE

## 2022-03-11 PROCEDURE — 87636 SARSCOV2 & INF A&B AMP PRB: CPT | Performed by: EMERGENCY MEDICINE

## 2022-03-11 PROCEDURE — 250N000011 HC RX IP 250 OP 636: Performed by: EMERGENCY MEDICINE

## 2022-03-11 RX ORDER — CLINDAMYCIN HCL 300 MG
300 CAPSULE ORAL 3 TIMES DAILY
Qty: 30 CAPSULE | Refills: 0 | Status: SHIPPED | OUTPATIENT
Start: 2022-03-11 | End: 2022-03-21

## 2022-03-11 RX ORDER — MUPIROCIN 20 MG/G
OINTMENT TOPICAL 3 TIMES DAILY
Qty: 1 G | Refills: 0 | Status: SHIPPED | OUTPATIENT
Start: 2022-03-11 | End: 2022-03-16

## 2022-03-11 RX ORDER — CLINDAMYCIN PHOSPHATE 900 MG/50ML
900 INJECTION, SOLUTION INTRAVENOUS ONCE
Status: COMPLETED | OUTPATIENT
Start: 2022-03-11 | End: 2022-03-11

## 2022-03-11 RX ADMIN — CLINDAMYCIN PHOSPHATE 900 MG: 900 INJECTION, SOLUTION INTRAVENOUS at 08:51

## 2022-03-11 ASSESSMENT — ENCOUNTER SYMPTOMS
VOMITING: 0
NAUSEA: 0
SHORTNESS OF BREATH: 0
COUGH: 0
FEVER: 1
WOUND: 1

## 2022-03-11 NOTE — ED TRIAGE NOTES
Went to clinic yesterday for a wound in nose. Diagnosed with suspected MRSA infection. Taking bactrim for it. Was told to come in if it got worse. Now experiencing low grade fevers.

## 2022-03-11 NOTE — DISCHARGE INSTRUCTIONS
Stop taking the Bactrim that you are prescribed and begin taking the clindamycin.    Discharge Instructions  Cellulitis    Cellulitis is an infection of the skin that occurs when bacteria enter the skin.   Symptoms are generally redness, swelling, warmth and pain.  Your infection appeared to be appropriate to treat at home with antibiotics.  However, sometimes your infection may be worse than it seemed at first, or may worsen with time. If you have new or worse symptoms, you may need to be seen again in the Emergency Department or by your primary provider.    Generally, every Emergency Department visit should have a follow-up clinic visit with either a primary or a specialty clinic/provider. Please follow-up as instructed by your emergency provider today.    Return to the Emergency Department if:  The redness, pain, or swelling gets a lot worse.  If the red area was marked, return if it is red significantly beyond the marked area.  You are unable to get your antibiotics, or are vomiting (throwing up) these pills, or you cannot take them.  You are feeling more ill, weak or lightheaded.  You start to run a new fever (temperature >101 F).  Anything else about the infection worries or concerns you.  Treatment:  Start your antibiotics right away, and take them as prescribed. Be sure to finish the whole prescription, even if you are better.  Apply a heating pad, warm packs, or warm water soaks to the infected area for 15 minutes at a time, at least 3 times a day. Do not use a heating pad on your feet or legs if you have diabetes. Do not sleep with a heating pad on, since this can cause burns or skin injury.  Rest your injured area for at least 1-2 days. After that you may start using your extremity again as long as there is not too much pain.   Raise the injured area above the level of your heart as much as possible in the first 1-2 days.  Tylenol  (acetaminophen), Motrin  (ibuprofen), or Advil  (ibuprofen) may help may  help reduce pain and fever and may help you feel more comfortable. Be sure to read and follow the package directions, and ask your provider if you have questions.    If you were given a prescription for medicine here today, be sure to read all of the information (including the package insert) that comes with your prescription.  This will include important information about the medicine, its side effects, and any warnings that you need to know about.  The pharmacist who fills the prescription can provide more information and answer questions you may have about the medicine.  If you have questions or concerns that the pharmacist cannot address, please call or return to the Emergency Department.     Remember that you can always come back to the Emergency Department if you are not able to see your regular provider in the amount of time listed above, if you get any new symptoms, or if there is anything that worries you.

## 2022-03-11 NOTE — ED PROVIDER NOTES
History   Chief Complaint:  Wound Check      HPI   Dominic Gibbs is a 31 year old male who presents for a wound check. Yesterday the patient was seen in an urgent care for three days of an area of pain and swelling to the right side of his nose. At that time his symptoms were attributed to cellulitis and he was prescribed a course of Bactrim and advised to come into the ED for worsening. This morning he started to develop a fever that has been as high as 100.8, prompting him to come into the ED for evaluation. He has not had any discharge from the area. He denies any dental pain, cough, shortness of breath, nausea, or vomiting.     Review of Systems   Constitutional: Positive for fever.   HENT: Negative for dental problem.    Respiratory: Negative for cough and shortness of breath.    Gastrointestinal: Negative for nausea and vomiting.   Skin: Positive for wound.   All other systems reviewed and are negative.      Allergies:  Norcuron     Medications:  Aspirin  Atenolol   Bactrim     Past Medical History:     Benign essential tremor  Pulmonary stenosis   Right bundle branch block   Scoliosis     Past Surgical History:    Right ventricle to pulmonary artery outflow patch  Back surgery  Heart catheterization  Right ventricle to pulmonary artery conduit change, suture closure of residual VSD   Status post 18 mm pulmonary homograft in right ventricular outflow tract with VSD closure     Social History:  The patient presents to the ED alone.     Physical Exam     Patient Vitals for the past 24 hrs:   BP Temp Temp src Pulse Resp SpO2   03/11/22 0945 -- -- -- -- -- 98 %   03/11/22 0900 -- -- -- -- -- 98 %   03/11/22 0753 135/79 98  F (36.7  C) Temporal 85 20 100 %       Physical Exam  Constitutional: Well appearing.  HEENT: Atraumatic.  PERRL.  EOMI. there is an area of swelling and induration in the right nostril and just inferior to the right nostril.  There are 2 areas to come to a hahn without obvious  fluctuance.  No gingival or dental lesions.  No visible abnormality further in the nostril.  No redness or erythema of the nose itself.  Moist mucous membranes.  Neck: Soft.  Supple.   Cardiac: Regular rate and rhythm.  No murmur or rub.  Respiratory: Clear to auscultation bilaterally.  No respiratory distress.   Abdomen: Soft and nontender. Nondistended.  Musculoskeletal: No edema.  Normal range of motion.  Neurologic: Alert and oriented.  Normal tone and bulk.  Normal gait.  Skin: No rashes.  No edema.  Psych: Normal affect.  Normal behavior.    Emergency Department Course     Laboratory:  Labs Ordered and Resulted from Time of ED Arrival to Time of ED Departure   BASIC METABOLIC PANEL - Normal       Result Value    Sodium 140      Potassium 4.6      Chloride 109      Carbon Dioxide (CO2) 27      Anion Gap 4      Urea Nitrogen 9      Creatinine 1.13      Calcium 9.0      Glucose 94      GFR Estimate 90     INFLUENZA A/B & SARS-COV2 PCR MULTIPLEX - Normal    Influenza A PCR Negative      Influenza B PCR Negative      SARS CoV2 PCR Negative     CBC WITH PLATELETS AND DIFFERENTIAL    WBC Count 10.3      RBC Count 5.18      Hemoglobin 14.3      Hematocrit 44.4      MCV 86      MCH 27.6      MCHC 32.2      RDW 12.9      Platelet Count 202      % Neutrophils 81      % Lymphocytes 9      % Monocytes 9      % Eosinophils 1      % Basophils 0      % Immature Granulocytes 0      NRBCs per 100 WBC 0      Absolute Neutrophils 8.3      Absolute Lymphocytes 0.9      Absolute Monocytes 0.9      Absolute Eosinophils 0.1      Absolute Basophils 0.0      Absolute Immature Granulocytes 0.0      Absolute NRBCs 0.0        Emergency Department Course:     Reviewed:  I reviewed nursing notes, vitals and past medical history    Assessments:  0833:  I obtained history and examined the patient as noted above.    I rechecked the patient and explained findings.     Interventions:  0851 Clindamycin 900 mg IV     Disposition:  The patient was  discharged to home.     Impression & Plan   Medical Decision Making:  Dominic Gibbs is a 31-year-old man who is afebrile and hemodynamically stable.  He does have some small areas of induration and erythema without any obvious fluctuance in the nose.  I did inform bedside ultrasound revealed no evidence of a fluid collection.  Lab work-up is noted as above.  Is no leukocytosis.  His vital signs are stable with no fever here.  Discussed potential viral illness as well as not convinced this area of abnormality around the nose would be causing his fever.  Nonetheless he was given IV clindamycin and will switch from clindamycin if there is a developing abscess, however, there is nothing that is drainable at this time, particularly given the area and concern for Comesis.  He is in agreement's plan and was given ENT for follow-up should he have any worsening.  I discussed primary care follow-up.  Return precautions were given and he was in no distress at time of discharge.     Diagnosis:    ICD-10-CM    1. Cellulitis of face  L03.211        Discharge Medications:  Discharge Medication List as of 3/11/2022 10:17 AM      START taking these medications    Details   clindamycin (CLEOCIN) 300 MG capsule Take 1 capsule (300 mg) by mouth 3 times daily for 10 days, Disp-30 capsule, R-0, E-Prescribe      mupirocin (BACTROBAN) 2 % external ointment Apply topically 3 times daily for 5 daysDisp-1 g, [id number]             Scribe Disclosure:  Dany WOODS, am serving as a scribe at 8:33 AM on 3/11/2022 to document services personally performed by Bert Lyon MD based on my observations and the provider's statements to me.         Bert Lyon MD  03/12/22 8620

## 2022-03-27 ENCOUNTER — OFFICE VISIT (OUTPATIENT)
Dept: URGENT CARE | Facility: URGENT CARE | Age: 32
End: 2022-03-27
Payer: COMMERCIAL

## 2022-03-27 VITALS
OXYGEN SATURATION: 98 % | SYSTOLIC BLOOD PRESSURE: 136 MMHG | TEMPERATURE: 98.9 F | HEART RATE: 81 BPM | DIASTOLIC BLOOD PRESSURE: 76 MMHG | RESPIRATION RATE: 16 BRPM

## 2022-03-27 DIAGNOSIS — J02.9 ACUTE PHARYNGITIS, UNSPECIFIED ETIOLOGY: ICD-10-CM

## 2022-03-27 DIAGNOSIS — R50.9 FEVER IN ADULT: ICD-10-CM

## 2022-03-27 DIAGNOSIS — Q21.3 TETRALOGY OF FALLOT: ICD-10-CM

## 2022-03-27 DIAGNOSIS — J02.8 BACTERIAL PHARYNGITIS: Primary | ICD-10-CM

## 2022-03-27 DIAGNOSIS — R05.9 COUGH: ICD-10-CM

## 2022-03-27 DIAGNOSIS — B96.89 BACTERIAL PHARYNGITIS: Primary | ICD-10-CM

## 2022-03-27 LAB
BASOPHILS # BLD AUTO: 0 10E3/UL (ref 0–0.2)
BASOPHILS NFR BLD AUTO: 0 %
DEPRECATED S PYO AG THROAT QL EIA: NEGATIVE
EOSINOPHIL # BLD AUTO: 0.2 10E3/UL (ref 0–0.7)
EOSINOPHIL NFR BLD AUTO: 1 %
ERYTHROCYTE [DISTWIDTH] IN BLOOD BY AUTOMATED COUNT: 12.7 % (ref 10–15)
HCT VFR BLD AUTO: 39.6 % (ref 40–53)
HGB BLD-MCNC: 13 G/DL (ref 13.3–17.7)
LYMPHOCYTES # BLD AUTO: 1.3 10E3/UL (ref 0.8–5.3)
LYMPHOCYTES NFR BLD AUTO: 11 %
MCH RBC QN AUTO: 27.8 PG (ref 26.5–33)
MCHC RBC AUTO-ENTMCNC: 32.8 G/DL (ref 31.5–36.5)
MCV RBC AUTO: 85 FL (ref 78–100)
MONOCYTES # BLD AUTO: 1.5 10E3/UL (ref 0–1.3)
MONOCYTES NFR BLD AUTO: 13 %
MONOCYTES NFR BLD AUTO: NEGATIVE %
NEUTROPHILS # BLD AUTO: 8.7 10E3/UL (ref 1.6–8.3)
NEUTROPHILS NFR BLD AUTO: 75 %
PLATELET # BLD AUTO: 266 10E3/UL (ref 150–450)
RBC # BLD AUTO: 4.68 10E6/UL (ref 4.4–5.9)
WBC # BLD AUTO: 11.6 10E3/UL (ref 4–11)

## 2022-03-27 PROCEDURE — 36415 COLL VENOUS BLD VENIPUNCTURE: CPT | Performed by: PHYSICIAN ASSISTANT

## 2022-03-27 PROCEDURE — 85025 COMPLETE CBC W/AUTO DIFF WBC: CPT | Performed by: PHYSICIAN ASSISTANT

## 2022-03-27 PROCEDURE — 86308 HETEROPHILE ANTIBODY SCREEN: CPT | Performed by: PHYSICIAN ASSISTANT

## 2022-03-27 PROCEDURE — 87651 STREP A DNA AMP PROBE: CPT | Performed by: PHYSICIAN ASSISTANT

## 2022-03-27 PROCEDURE — 99204 OFFICE O/P NEW MOD 45 MIN: CPT | Performed by: PHYSICIAN ASSISTANT

## 2022-03-28 ENCOUNTER — TELEPHONE (OUTPATIENT)
Dept: UROLOGY | Facility: CLINIC | Age: 32
End: 2022-03-28
Payer: COMMERCIAL

## 2022-03-28 LAB — GROUP A STREP BY PCR: NOT DETECTED

## 2022-03-28 NOTE — PROGRESS NOTES
ASSESSMENT/PLAN:     (J02.8,  B96.89) Bacterial pharyngitis  (primary encounter diagnosis)    MDM: Bacterial non-Strep pharyngitis. Persistent sore throat and fever x 1 week in a 31 year old male with a past medical history that includes Tetralogy of Fallot repair, pulmonary stenosis and reactive airway (see below for full past medical history). Patient has had some cough (but describes that as mild and improving). Negative chest x-ray at another facility 2 days ago by patient report. Rapid Strep is negative. Strep PCR is pending. Mildly elevated WBC with left shift. [place] heterophile negative.     Plan: amoxicillin-clavulanate (AUGMENTIN) 875-125 MG         Tablet    -Augmentin  -Follow-up with primary care provider if fever not resolved and if sore throat not improving in 2 days, if not resolved in 1 week, and immediately if any worsening   -Pharyngitis educational handout provided   -All lab results are reviewed with patient and provided in printed form to review with PCP in future       (R05.9) Cough      (J02.9) Acute pharyngitis, unspecified etiology  Plan: Streptococcus A Rapid Screen w/Reflex to PCR -         Clinic Collect, Group A Streptococcus PCR         Throat Swab, CBC with platelets and         differential, Mononucleosis screen      (R50.9) Fever in adult  Plan: CBC with platelets and differential,         Mononucleosis screen    ------------------------------------        SUBJECTIVE:     Dominic Amor a 31 year old male with a past medical history that includes Tetralogy of Fallot repair, pulmonary stenosis and reactive airway (see below for full past medical history),  who presents to  today for evaluation of sore throat  and fever x 1 week duration. He reports fever is only at night. Highest home temperature was 101.4   Patient confirms they are still able to take in good fluids and soft food despite sore throat.     Of note: Patient states he has had a negative chest x-ray at another  facility, has taken 3 negative home Covid-19 test, and has had a negative Covid-19 test at  [facility] [place] (and is awaiting the second step Covid-19 PCR test result)      Associated Symptoms: Cough (that he describes as mild and improving). He reports a few, scattered,  transient hives, only at night, for the past 2 nights. No hives now. No other rash.      Illness Contact: No known close contact Strep, Mono, Covid-19 or other illness exposure       ROS: No associated severe cough,coughing up of blood, blue lips/fingers/toes, shortness of breath, chest pain, wheezing, abdominal pain, nausea, vomiting, diarrhea, severe body aches, severe headaches, rashes (see above regarding few scattered transient hives)/ No acute joint swelling/hear/redness or other acute illness symptoms.       Past Medical History:   Diagnosis Date     Benign essential tremor      Pulmonary stenosis      RBBB      Reactive airway disease      Scoliosis     s/p surgical repair     Tetralogy of Fallot s/p repair        There is no problem list on file for this patient.      Current Outpatient Medications   Medication     aspirin 81 MG EC tablet     atenolol (TENORMIN) 25 MG tablet     MULTIPLE VITAMINS-MINERALS ER PO     No current facility-administered medications for this visit.       Allergies   Allergen Reactions     Norcuron Anaphylaxis        OBJECTIVE:  /76   Pulse 81   Temp 98.9  F (37.2  C) (Tympanic)   Resp 16   SpO2 98%             General appearance: alert and no apparent distress  Skin color is pink and without rash or hives.  HEENT:   Conjunctiva not injected.  Sclera clear.  Left TM is normal: no effusions, no erythema, and normal landmarks.  Right TM is normal: no effusions, no erythema, and normal landmarks.  Nasal mucosa is normal.  Oropharyngeal exam is positive for mild to moderateerythema.  No asymmetry. Uvula is midline. No trismus. Voice is clear. No lesions, adenopathy, plaque or exudate.  Neck is supple, FROM.  No neck stiffness. No adenopathy  CARDIAC: Positive for 3/6 REBECA. Regular rate and rhythm. No extra heart sounds.   RESP: No increased work of breathing. Few scattered rhonchi that clear with coughing.  Lung fields are otherwise clear to ausculation. No rales, rhonchi, or wheezing.  ABDOMEN:  Soft, non-tender, non-distended.  Positive normal bowel sounds.  No HSM or masses.  No suprapubic tenderness.  No CVA tenderness.  NEURO: Alert and oriented.  Normal speech and mentation.  CN II/XII grossly intact.  Gait within normal limits.          LAB:     Results for orders placed or performed in visit on 03/27/22   Streptococcus A Rapid Screen w/Reflex to PCR - Clinic Collect     Status: Normal    Specimen: Throat; Swab   Result Value Ref Range    Group A Strep antigen Negative Negative     Component      Latest Ref Rng & Units 3/27/2022   WBC      4.0 - 11.0 10e3/uL 11.6 (H)   RBC Count      4.40 - 5.90 10e6/uL 4.68   Hemoglobin      13.3 - 17.7 g/dL 13.0 (L)   Hematocrit      40.0 - 53.0 % 39.6 (L)   MCV      78 - 100 fL 85   MCH      26.5 - 33.0 pg 27.8   MCHC      31.5 - 36.5 g/dL 32.8   RDW      10.0 - 15.0 % 12.7   Platelet Count      150 - 450 10e3/uL 266   % Neutrophils      % 75   % Lymphocytes      % 11   % Monocytes      % 13   % Eosinophils      % 1   % Basophils      % 0   Absolute Neutrophils      1.6 - 8.3 10e3/uL 8.7 (H)   Absolute Lymphocytes      0.8 - 5.3 10e3/uL 1.3   Absolute Monocytes      0.0 - 1.3 10e3/uL 1.5 (H)   Absolute Eosinophils      0.0 - 0.7 10e3/uL 0.2   Absolute Basophils      0.0 - 0.2 10e3/uL 0.0        Component      Latest Ref Rng & Units 3/27/2022   Mononucleosis Screen      Negative Negative     Covid-19 PCR--negative 2 days ago at another clinic by patient report     Negative Chest X-Ray at another facility 2 days ago by patient report

## 2022-03-28 NOTE — TELEPHONE ENCOUNTER
[facility] Call Center    Phone Message    May a detailed message be left on voicemail: yes     Reason for Call: Other: Dominic is calling stating that he went to urgent care yesterday 3/27 and that they diagnosed him with a bacterial infection and he is taking amoxicillin-clavulanate (AUGMENTIN) 875-125 MG tablet for 10 days and is wondering if his vasectomy scheduled for 4/4 should be post poned. Please call Dominic to discuss, thank you!     Action Taken: Other: [facility] Uro    Travel Screening: Not Applicable

## 2022-03-28 NOTE — PATIENT INSTRUCTIONS
Patient Education     Pharyngitis:  (Presumed Non-Strep Throat Infection )    You have pharyngitis (sore throat). The healthcare staff may think your sore throat is caused by a bacteria.   This can cause throat pain that is worse when swallowing, aching all over, headache, swollen lymph nodes or glands in the neck, and fever. The infection is contagious. It often spreads by coughing, kissing, or touching others after touching your mouth or nose. An antibiotic is given to treat the infection. Antibiotics are prescribed by doctors to treat bacterial infections, not viral infections.   Home care    Rest at home. Drink plenty of fluids so you won t get dehydrated.    Stay home from work or school for the first 24 hours of taking the antibiotics, or as directed by the healthcare provider. After this time, you won't be contagious. You can then return to work or school if you are feeling better, or as directed by the provider.     Take the antibiotic medicine for the full 10 days, or as directed by the provider, even when you feel better or your symptoms improve. This is very important to make sure the infection is fully treated. It's also important to prevent medicine-resistant germs from growing. It's also the best way to prevent rheumatic fever, which can affect your heart and other parts of the body. If you were given an antibiotic shot, your provider will tell you if more antibiotics are needed.    You may use acetaminophen or ibuprofen to control pain or fever, unless another medicine was prescribed for this. If you have chronic liver or kidney disease or ever had a stomach ulcer or gastrointestinal bleeding, talk with your provider before using these medicines.    Use throat lozenges or a throat-numbing spray to help reduce throat pain. Gargling with warm salt water can also help reduce throat pain. Dissolve 1/2 teaspoon of salt in 1 glass of warm water.     Don t eat salty or spicy foods. These can irritate the  throat.    Follow-up care  Follow up with your healthcare provider or our staff if you don't feel better in 72 hours, or as directed.   When to get medical advice  Call your healthcare provider right away if any of these occur:     Fever of 100.4 F (38 C), or higher, or as directed by your provider    New or worse ear pain, sinus pain, or headache    Painful lumps in the back of neck    Stiff neck    Lymph nodes that get larger or neck swelling    Can t open mouth wide due to throat pain    Signs of dehydration, such as very dark urine or no urine, sunken eyes, dizziness    Noisy breathing    Muffled voice    New rash    Symptoms are worse    New symptoms develop  Call 911  Call 911 if you have any of these symptoms:     Can't swallow, especially saliva, with a lot of drooling    Trouble breathing or wheezing    Feeling faint    Can't talk    Feeling of doom  Prevention  Here are steps you can take to help prevent an infection:    Keep good handwashing habits.    Don t have close contact with people who have sore throats, colds, or other upper respiratory infections.    Don t smoke, and stay away from secondhand smoke.    Stay up to date with all of your vaccines.  [employer] last reviewed this educational content on 2/1/2020 2000-2021 [employer]. All rights reserved. This information is not intended as a substitute for professional medical care. Always follow your healthcare professional's instructions.             Results for orders placed or performed in visit on 03/27/22   Mononucleosis screen     Status: Normal   Result Value Ref Range    Mononucleosis Screen Negative Negative   CBC with platelets and differential     Status: Abnormal   Result Value Ref Range    WBC Count 11.6 (H) 4.0 - 11.0 10e3/uL    RBC Count 4.68 4.40 - 5.90 10e6/uL    Hemoglobin 13.0 (L) 13.3 - 17.7 g/dL    Hematocrit 39.6 (L) 40.0 - 53.0 %    MCV 85 78 - 100 fL    MCH 27.8 26.5 - 33.0 pg    MCHC 32.8 31.5 - 36.5 g/dL     RDW 12.7 10.0 - 15.0 %    Platelet Count 266 150 - 450 10e3/uL    % Neutrophils 75 %    % Lymphocytes 11 %    % Monocytes 13 %    % Eosinophils 1 %    % Basophils 0 %    Absolute Neutrophils 8.7 (H) 1.6 - 8.3 10e3/uL    Absolute Lymphocytes 1.3 0.8 - 5.3 10e3/uL    Absolute Monocytes 1.5 (H) 0.0 - 1.3 10e3/uL    Absolute Eosinophils 0.2 0.0 - 0.7 10e3/uL    Absolute Basophils 0.0 0.0 - 0.2 10e3/uL   Streptococcus A Rapid Screen w/Reflex to PCR - Clinic Collect     Status: Normal    Specimen: Throat; Swab   Result Value Ref Range    Group A Strep antigen Negative Negative   CBC with platelets and differential     Status: Abnormal    Narrative    The following orders were created for panel order CBC with platelets and differential.  Procedure                               Abnormality         Status                     ---------                               -----------         ------                     CBC with platelets and d...[[id number]]  Abnormal            Final result                 Please view results for these tests on the individual orders.

## 2022-03-30 NOTE — TELEPHONE ENCOUNTER
Returned phone call to patient and tried to leave message. [employer] Assistant would not connect my call at this time.     Dinorah Ashby LPN

## 2022-03-30 NOTE — TELEPHONE ENCOUNTER
Pt is calling again, please call Wagner with an update, as he will be on antibiotics past his vasectomy procedure on 4/4/2022, thank you

## 2022-03-31 NOTE — TELEPHONE ENCOUNTER
"    I called and spoke to pt.  He will keep his vas appt for 4-4-22.  Pt states he is feeling better and has stopped his 81mg aspirin.  Pt states he took a tylenol a few days ago.  I informed him that is ok, just no blood thinners.  JACKELINE Woodward CMA            Documentation       Neelima Saha routed conversation to  Clinical Pool 1 hour ago (1:10 PM)     Neelima Saha 1 hour ago (1:10 PM)     AG       Pt called in regards to below, pt stated he will try and disable whatever google assistant is doing so the call can go through - if it can not go through please mychart the pt. Thanks!         Documentation      Sai Dominic M \"Wagner\" [phone]  Neelima Saha 1 hour ago (1:09 PM)     Dinorah Ashby LPN Yesterday (12:39 PM)     JJ       Returned phone call to patient and tried to leave message. Google Assistant would not connect my call at this time.      Dinorah Ashby LPN            Documentation       Anastasiia Adamson routed conversation to  Clinical Pool Yesterday (12:37 PM)     Anastasiia Adamson Yesterday (12:37 PM)     CW       Pt is calling again, please call Wagner with an update, as he will be on antibiotics past his vasectomy procedure on 4/4/2022, thank you         Documentation      Constantin Maria MD Johnson, Jeanette S, LPN 2 days ago         Pernell Copeland,    If he is feeling better, it would probably be safe to complete the vasectomy as scheduled.  However, if he would like to push it back a few weeks we could see if there is a later scheduled vasectomy patient that he could trade with.     Thanks,   Constantin Maria M.D.    Message text      Dinorah Ashby LPN Hinck, Bryan D, MD 3 days ago     QUINTEN    Please Advise,   Thanks!   ~Dinorah     Message text      Debbie Orourke routed conversation to  Clinical Pool 3 days ago     Debbie Orourke 3 days ago     [facility]     Phone Message     May a detailed message be left on voicemail: yes      Reason for Call: Other: " "Dominic is calling stating that he went to urgent care yesterday 3/27 and that they diagnosed him with a bacterial infection and he is taking amoxicillin-clavulanate (AUGMENTIN) 875-125 MG tablet for 10 days and is wondering if his vasectomy scheduled for 4/4 should be post poned. Please call Dominic to discuss, thank you!      Action Taken: Other: UA Uro     Travel Screening: Not Applicable                                                                                                                                                                                                                                                                                                                                                                                                                                                                                                                                                                                                                                                                                          Documentation      Dominic Gibbs M \"Wagner\" [phone]  Debbie Orourke          "

## 2022-03-31 NOTE — TELEPHONE ENCOUNTER
Pt called in regards to below, pt stated he will try and disable whatever google assistant is doing so the call can go through - if it can not go through please mychart the pt. Thanks!

## 2022-04-04 ENCOUNTER — OFFICE VISIT (OUTPATIENT)
Dept: UROLOGY | Facility: CLINIC | Age: 32
End: 2022-04-04
Payer: COMMERCIAL

## 2022-04-04 VITALS
DIASTOLIC BLOOD PRESSURE: 70 MMHG | BODY MASS INDEX: 24.25 KG/M2 | SYSTOLIC BLOOD PRESSURE: 130 MMHG | HEIGHT: 68 IN | WEIGHT: 160 LBS

## 2022-04-04 DIAGNOSIS — Z30.2 ENCOUNTER FOR STERILIZATION: Primary | ICD-10-CM

## 2022-04-04 PROCEDURE — 88302 TISSUE EXAM BY PATHOLOGIST: CPT | Performed by: PATHOLOGY

## 2022-04-04 PROCEDURE — 55250 REMOVAL OF SPERM DUCT(S): CPT | Performed by: UROLOGY

## 2022-04-04 ASSESSMENT — PAIN SCALES - GENERAL: PAINLEVEL: NO PAIN (0)

## 2022-04-04 NOTE — PROGRESS NOTES
OFFICE VASECTOMY OPERATIVE NOTE  [facility]    DATE: 04/04/22  PATIENT: Dominic Gibbs    YOB: 1990    Dominic Gibbs is a 31 year old male.  He has 0 children and he wishes a vasectomy for birth control.  He has read the brochure and he has shaved himself.  I reviewed the vasectomy procedure with him explaining that it would be done with a local anesthetic given just in the location where the vasectomy would be done.  It would be done through incisions with the removal of segments of the vasa, cauterization of the ends, and burying the ends separate with sutures.      Pt. Understands:  -1/1000-1/3000 risk of future pregnancy even with perfectly done vasectomy  -vasectomy is a permanent procedure    -he may cryopreserve sperm if he wishes   -1-5% risk of post-vasectomy pain syndrome   -1-5% risk of complication, primarily infection or bleeding  - he needs to have a semen sample that shows no sperm before getting approval for unprotected intercourse.      Complications such as bleeding, infection, and damage to other tissues in the area were discussed. I recommended that an ice bag be placed on the scrotum off and on tonight to help reduce pain and swelling.      He was reminded that he was not sterile immediately after the vasectomy that it would take at least 20 ejaculations to empty the vas of any remaining sperm.  He was not to provide a semen sample until after the 20th ejaculation and not before 12 weeks after the vas. He was  to fulfill both of those requirements.   He understands it is his responsibility to find out the results of the vas before proceeding with intercourse without birth control protection.  Other items discussed were activity afterwards, returning to work, voluntary physical activity,  resuming sexual activity, clothing to wear, bathing, and care of the vas site and expected changes in the site as healing progresses.  After signing the permit, bilateral vasectomy was done as  described below.     ANESTHESIA: Local    DETAILS OF PROCEDURE: The risks of the procedure were explained in detail to the patient and informed consent was obtained. The patient was placed supine on the procedure table and the penis and scrotum were prepped and draped in the standard sterile fashion. The right vas deferens was isolated and brought up to the skin. 1% lidocaine local anesthesia was used to infiltrate the skin and the spermatic cord. A small incision was created and adventitial tissues were swept away from the vas. A 1 cm segment of the vas was excised and sent for pathology. The proximal and distal lumina of the vas were cauterized and then each segment was tied off in a knuckling-fashion with a 3-0 vicryl suture. Hemostasis was ensured and the segments were released back into the scrotum. Meticulous hemostasis was achieved. The skin was closed with 3-0 chromic suture in a horizontal mattress fashion. Next the left vas was brought to the skin and a vasectomy was performed in the similar fashion. The skin was closed with 3-0 chromic suture in a horizontal mattress fashion.    COMPLICATIONS: None    TAKE HOME MEDICATIONS: Tylenol every 6 hours, PRN    DISMISSAL INSTRUCTIONS:  - Ice pack to scrotum 15 to 20 minutes each hour awake for 36 to 40 hours.  - No strenuous activity or ejaculation for at least 7 days.  - No unprotected sexual activity until proven azoospermia on semen samples at 3 months.  - Referred to patient handout for normal postop expectations and indications to contact nurse or physician.    M.D.: Constantin Maria MD

## 2022-04-04 NOTE — LETTER
4/4/2022       RE: Dominic Gibbs  [address] [place] [address]     Dear Colleague,    Thank you for referring your patient, Dominic Gibbs, to the [facility]. Please see a copy of my visit note below.    OFFICE VASECTOMY OPERATIVE NOTE  [facility]    DATE: 04/04/22  PATIENT: Dominic Gibbs    YOB: 1990    Dominic Gibbs is a 31 year old male.  He has 0 children and he wishes a vasectomy for birth control.  He has read the brochure and he has shaved himself.  I reviewed the vasectomy procedure with him explaining that it would be done with a local anesthetic given just in the location where the vasectomy would be done.  It would be done through incisions with the removal of segments of the vasa, cauterization of the ends, and burying the ends separate with sutures.      Pt. Understands:  -1/1000-1/3000 risk of future pregnancy even with perfectly done vasectomy  -vasectomy is a permanent procedure    -he may cryopreserve sperm if he wishes   -1-5% risk of post-vasectomy pain syndrome   -1-5% risk of complication, primarily infection or bleeding  - he needs to have a semen sample that shows no sperm before getting approval for unprotected intercourse.      Complications such as bleeding, infection, and damage to other tissues in the area were discussed. I recommended that an ice bag be placed on the scrotum off and on tonight to help reduce pain and swelling.      He was reminded that he was not sterile immediately after the vasectomy that it would take at least 20 ejaculations to empty the vas of any remaining sperm.  He was not to provide a semen sample until after the 20th ejaculation and not before 12 weeks after the vas. He was  to fulfill both of those requirements.   He understands it is his responsibility to find out the results of the vas before proceeding with intercourse without birth control  protection.  Other items discussed were activity afterwards, returning to work, voluntary physical activity,  resuming sexual activity, clothing to wear, bathing, and care of the vas site and expected changes in the site as healing progresses.  After signing the permit, bilateral vasectomy was done as described below.     ANESTHESIA: Local    DETAILS OF PROCEDURE: The risks of the procedure were explained in detail to the patient and informed consent was obtained. The patient was placed supine on the procedure table and the penis and scrotum were prepped and draped in the standard sterile fashion. The right vas deferens was isolated and brought up to the skin. 1% lidocaine local anesthesia was used to infiltrate the skin and the spermatic cord. A small incision was created and adventitial tissues were swept away from the vas. A 1 cm segment of the vas was excised and sent for pathology. The proximal and distal lumina of the vas were cauterized and then each segment was tied off in a knuckling-fashion with a 3-0 vicryl suture. Hemostasis was ensured and the segments were released back into the scrotum. Meticulous hemostasis was achieved. The skin was closed with 3-0 chromic suture in a horizontal mattress fashion. Next the left vas was brought to the skin and a vasectomy was performed in the similar fashion. The skin was closed with 3-0 chromic suture in a horizontal mattress fashion.    COMPLICATIONS: None    TAKE HOME MEDICATIONS: Tylenol every 6 hours, PRN    DISMISSAL INSTRUCTIONS:  - Ice pack to scrotum 15 to 20 minutes each hour awake for 36 to 40 hours.  - No strenuous activity or ejaculation for at least 7 days.  - No unprotected sexual activity until proven azoospermia on semen samples at 3 months.  - Referred to patient handout for normal postop expectations and indications to contact nurse or physician.    M.D.: Constantin Maria MD

## 2022-04-04 NOTE — NURSING NOTE
Chief Complaint   Patient presents with     Sterilization     Patient here for a vasectomy      Patient has signed the consent form stating that we will be doing a bilateral vasectomy today and that this is the correct procedure.  I verbally confirmed the patient's identity using two indicators, relevant allergies, and that the correct equipment was available. Patient was cleaned and prepped according to the appropriate policy.  Equipment was prepped in a sterile fashion and MD was informed that patient was ready.    Consent read and signed: Yes  Aspirin/blood thinning products stopped 7 days prior to procedure: Yes  Allergies   Allergen Reactions     Norcuron Anaphylaxis       Physician performed procedure.  After the procedure the patient was instructed to wait approximately three months and at least 30 ejaculations prior to returning a sample to confirm sterility.  The patient was instructed to bring in sample within 3-6 hours post sample ejaculation.  Any additional medications after the procedure were sent to the patient's pharmacy and instructions were given according to company protocol and the performing physician.  The physician performing the procedure prescribed as they felt appropriate.  Patient was also instructed to avoid heavy lifting or strenuous activity for 10-14 days and to ice the scrotum.  Samples from the R and L vas deferens were sent to the lab and an order was placed for future semen analysis.      The following medication was given:     MEDICATION:  Lidocaine 1% Soln  ROUTE: Local Infilitration   SITE: Scrotum   DOSE: 40ml   LOT #: [id number]  : [employer]  EXPIRATION DATE: 07/25  NDC#: 91112-085-82  Was there drug waste? Yes  Amount of drug waste (mL): 20ml .  Reason for waste:  As per MD  Multi-dose vial: Yes    Pina Renner  April 4, 2022

## 2022-04-04 NOTE — PATIENT INSTRUCTIONS
POST VASECTOMY INSTRUCTIONS    1.) If you have any concerns or questions, please contact our office at [phone].     2.) It is okay to take a shower, however, do not soak in water (bath,swimming, hot tub,etc....) until your incision is healed.    3.) You might notice some swelling, mild bruising, and discomfort for several days after your vasectomy. This is to be expected. For at least the next 24 hours, an ice pack should be applied for 20 minutes every hour that you are awake. Ice will help with discomfort and swelling. Do not place directly on the skin.    4.) No intercourse, strenuous activity or exercise for at least 7-10 days, even if you feel fine.    5.) You need to wear good scrotal support while you are healing. We strongly recommend an athletic supporter or a pair of regular briefs that are one size too small. Boxer briefs do not offer enough support.    6.) Tylenol as directed on the bottle is preferred for discomfort. Please avoid any blood thinning products such as ibuprofen and aspirin (Motrin, Advil, Excedrin, Aleve, ect..) for at least the next week.    7.) It is normal to have mild drainage from the incision area for several days. However, please contact our office if you notice: bright red blood that does not stop after three days, increased pain, heat at the incision, red streaks, foul smelling discharge, or if you start to run a fever.     8.) YOU MUST CONTINUE BIRTH CONTROL UNTIL WE CONFIRM YOUR STERILITY.  This process can take up to a year to complete (rare occurrence).     9.) You have been given a form with specimen cup and instructions for your follow up specimen. You will be cleared once we receive ONE negative specimen. If your specimen comes back positive (sperm seen) you will be asked to repeat the test. This does not mean that your vasectomy has failed.

## 2022-04-06 LAB
PATH REPORT.COMMENTS IMP SPEC: NORMAL
PATH REPORT.COMMENTS IMP SPEC: NORMAL
PATH REPORT.FINAL DX SPEC: NORMAL
PATH REPORT.GROSS SPEC: NORMAL
PATH REPORT.MICROSCOPIC SPEC OTHER STN: NORMAL
PATH REPORT.RELEVANT HX SPEC: NORMAL
PHOTO IMAGE: NORMAL

## 2022-04-10 ENCOUNTER — HEALTH MAINTENANCE LETTER (OUTPATIENT)
Age: 32
End: 2022-04-10

## 2022-04-12 ENCOUNTER — NURSE TRIAGE (OUTPATIENT)
Dept: NURSING | Facility: CLINIC | Age: 32
End: 2022-04-12

## 2022-04-12 NOTE — TELEPHONE ENCOUNTER
Nurse Triage SBAR    Is this a 2nd Level Triage?   Yes    Situation:     Some tenderness and some mild pain upon moving through out the day in the cords and incision area of the Vasectomy.     Background:  Vasectomy    Assessment:    Pt reporting, through out the day.  Pt is noticing some swelling in the cord area and the incisional area through out the day.    Also some swelling in the testicle area.  Pt mentions by the end of the day it hurts in the groin area when he is walking around.    So ends up just sitting around and relaxing because it hurts to walk.   Because of the swelling and aggentation in that area when walking around.     No fever, hot sweats, cold sweats, or the chills.   Good urine flow and output.   Incision area looks good.  But can feel a lump or bump in that area when he is applying neosporin to the incision area.   But is concerned about the extra pain and swelling by the end of the day.         Protocol Recommended Disposition:   Would like a call back from the clinic.    Recommendation:   Please call Pt back @ [phone] for further assistance.     Route to clinic for review and a call back to Pt for further assistance/answering questions.   Thank you     Brandi Cedillo RN  Central Triage Red Flags/Med Refills        Reason for Disposition    Patient wants to be seen    Additional Information    Negative: Sounds like a life-threatening emergency to the triager    Negative: Followed a genital area injury    Negative: Pain or burning with passing urine (urination) is main symptom    Negative: Pain in scrotum or testicle is main symptom    Negative: Swollen scrotum OR lump in the scrotum/groin area    Negative: Pubic lice suspected    Negative: STD exposure and prevention, question about    Negative: SEVERE pain (e.g., excruciating)    Negative: Large amount of blood from end of penis    Negative: Foreskin pulled back and stuck (not circumcised)    Negative: Fever > 100.4 F (38.0 C)     Negative: Unable to urinate (or only a few drops) and bladder feels very full    Negative: Prolonged unwanted erection (i.e., not related to sexual interest) and lasting > 4 hours    Negative: Patient sounds very sick or weak to the triager    Negative: Entire penis is swollen (i.e., edema)    Negative: Looks infected (e.g., draining sore, spreading redness)    Negative: Pain or burning with passing urine (urination)    Negative: Blood in urine (red, pink, or tea-colored)    Negative: Pus (white, yellow) or bloody discharge from end of penis    Negative: Swollen foreskin (not circumcised)    Negative: Tiny water blisters rash, 3 or more    Negative: Antibiotic treatment > 3 days for STD (e.g., penile discharge from gonorrhea, chlamydia) and painful urination not improved    Protocols used: PENIS AND SCROTUM SYMPTOMS-A-[place]

## 2022-04-12 NOTE — TELEPHONE ENCOUNTER
Spoke to patient he states he is concerned about the pain and swelling of site . 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 Instructed to take OTC  Pain medications,ce and support and rest . Will have him come in tomorrow at 3 for Dr Maria to do brief exam .

## 2022-04-13 ENCOUNTER — OFFICE VISIT (OUTPATIENT)
Dept: UROLOGY | Facility: CLINIC | Age: 32
End: 2022-04-13
Payer: COMMERCIAL

## 2022-04-13 VITALS
DIASTOLIC BLOOD PRESSURE: 70 MMHG | WEIGHT: 160 LBS | HEIGHT: 68 IN | HEART RATE: 78 BPM | SYSTOLIC BLOOD PRESSURE: 110 MMHG | BODY MASS INDEX: 24.25 KG/M2 | OXYGEN SATURATION: 97 %

## 2022-04-13 DIAGNOSIS — Z98.52 S/P VASECTOMY: Primary | ICD-10-CM

## 2022-04-13 PROCEDURE — 99024 POSTOP FOLLOW-UP VISIT: CPT | Performed by: UROLOGY

## 2022-04-13 ASSESSMENT — PAIN SCALES - GENERAL: PAINLEVEL: MILD PAIN (2)

## 2022-04-13 NOTE — NURSING NOTE
Chief Complaint   Patient presents with     Vasectomy      Post op incision check   Patient states his incision has some swelling yet and discomfort.  Angeline Tavares LPN

## 2022-04-13 NOTE — LETTER
"4/13/2022       RE: Dominic Gibbs  [address] [place] [address]     Dear Colleague,    Thank you for referring your patient, Dominic Gibbs, to the [facility] at [facility]. Please see a copy of my visit note below.    AKIN   CHIEF COMPLAINT   It was my pleasure to see Dominic Gibbs who is a 31 year old male for follow-up of s/p vasectomy.      HPI   Dominic Gibbs is a very pleasant 31 year old male who is status post a vasectomy on 4/4/2022.  He noted some cord pain and small amount of bruising and presents for evaluation.    PHYSICAL EXAM  Patient is a 31 year old  male   Vitals: Blood pressure 110/70, pulse 78, height 1.727 m (5' 8\"), weight 72.6 kg (160 lb), SpO2 97 %.  Body mass index is 24.33 kg/m .  General Appearance Adult:   Alert, no acute distress, oriented  HENT: throat/mouth:normal, good dentition  Lungs: no respiratory distress, or pursed lip breathing  Heart: No obvious jugular venous distension present  Abdomen: soft, nontender, no organomegaly or masses  Musculoskeltal: extremities normal, no peripheral edema  Skin: no suspicious lesions or rashes  Neuro: Alert, oriented, speech and mentation normal  Psych: affect and mood normal  Gait: Normal  : Small amount of bruising around the incision sites.  No evidence of infection.  Normal testes, no palpable hematoma      ASSESSMENT and PLAN  31-year-old man who is status post a vasectomy on 4/4/2022    Status post vasectomy  -We discussed that he is recovering as expected  -Reassurance provided  -Recommend continued scrotal support, intermittent ice, and Tylenol and ibuprofen  -He may follow-up on a as needed basis      Time spent: 15 minutes spent on the date of the encounter doing chart review, history and exam, documentation and further activities as noted above.    Constantin Maria MD   Urology  [facility]  [facility] Phone: [phone]  [facility] Phone: [phone]

## 2022-04-13 NOTE — PROGRESS NOTES
"[facility]   CHIEF COMPLAINT   It was my pleasure to see Dominic Gibbs who is a 31 year old male for follow-up of s/p vasectomy.      HPI   Dominic Gibbs is a very pleasant 31 year old male who is status post a vasectomy on 4/4/2022.  He noted some cord pain and small amount of bruising and presents for evaluation.    PHYSICAL EXAM  Patient is a 31 year old  male   Vitals: Blood pressure 110/70, pulse 78, height 1.727 m (5' 8\"), weight 72.6 kg (160 lb), SpO2 97 %.  Body mass index is 24.33 kg/m .  General Appearance Adult:   Alert, no acute distress, oriented  HENT: throat/mouth:normal, good dentition  Lungs: no respiratory distress, or pursed lip breathing  Heart: No obvious jugular venous distension present  Abdomen: soft, nontender, no organomegaly or masses  Musculoskeltal: extremities normal, no peripheral edema  Skin: no suspicious lesions or rashes  Neuro: Alert, oriented, speech and mentation normal  Psych: affect and mood normal  Gait: Normal  : Small amount of bruising around the incision sites.  No evidence of infection.  Normal testes, no palpable hematoma      ASSESSMENT and PLAN  31-year-old man who is status post a vasectomy on 4/4/2022    Status post vasectomy  -We discussed that he is recovering as expected  -Reassurance provided  -Recommend continued scrotal support, intermittent ice, and Tylenol and ibuprofen  -He may follow-up on a as needed basis      Time spent: 15 minutes spent on the date of the encounter doing chart review, history and exam, documentation and further activities as noted above.    Constantin Maria MD   Urology  [facility]  [facility] Phone: [phone]  [facility] Phone: [phone]      "

## 2022-07-15 ENCOUNTER — LAB (OUTPATIENT)
Dept: LAB | Facility: CLINIC | Age: 32
End: 2022-07-15
Payer: COMMERCIAL

## 2022-07-15 DIAGNOSIS — Z30.2 ENCOUNTER FOR STERILIZATION: ICD-10-CM

## 2022-07-15 LAB
SEMEN ANALYSIS P VAS PNL: NORMAL
SPERM MOTILE SMN QL MICRO: NORMAL

## 2022-07-15 PROCEDURE — 89321 SEMEN ANAL SPERM DETECTION: CPT

## 2022-07-16 ENCOUNTER — OFFICE VISIT (OUTPATIENT)
Dept: URGENT CARE | Facility: URGENT CARE | Age: 32
End: 2022-07-16
Payer: COMMERCIAL

## 2022-07-16 ENCOUNTER — APPOINTMENT (OUTPATIENT)
Dept: CT IMAGING | Facility: CLINIC | Age: 32
End: 2022-07-16
Attending: EMERGENCY MEDICINE
Payer: COMMERCIAL

## 2022-07-16 ENCOUNTER — HOSPITAL ENCOUNTER (EMERGENCY)
Facility: CLINIC | Age: 32
Discharge: HOME | End: 2022-07-16
Attending: EMERGENCY MEDICINE | Admitting: EMERGENCY MEDICINE
Payer: COMMERCIAL

## 2022-07-16 VITALS
DIASTOLIC BLOOD PRESSURE: 71 MMHG | RESPIRATION RATE: 18 BRPM | SYSTOLIC BLOOD PRESSURE: 134 MMHG | HEART RATE: 52 BPM | TEMPERATURE: 97.6 F | OXYGEN SATURATION: 100 %

## 2022-07-16 VITALS
TEMPERATURE: 97.6 F | SYSTOLIC BLOOD PRESSURE: 112 MMHG | HEART RATE: 75 BPM | DIASTOLIC BLOOD PRESSURE: 76 MMHG | OXYGEN SATURATION: 100 %

## 2022-07-16 DIAGNOSIS — M54.50 BILATERAL LOW BACK PAIN WITHOUT SCIATICA, UNSPECIFIED CHRONICITY: ICD-10-CM

## 2022-07-16 DIAGNOSIS — R19.7 DIARRHEA, UNSPECIFIED TYPE: ICD-10-CM

## 2022-07-16 DIAGNOSIS — R10.13 EPIGASTRIC PAIN: ICD-10-CM

## 2022-07-16 DIAGNOSIS — R10.32 ABDOMINAL PAIN, LEFT LOWER QUADRANT: ICD-10-CM

## 2022-07-16 DIAGNOSIS — R68.81 EARLY SATIETY: ICD-10-CM

## 2022-07-16 DIAGNOSIS — R19.5 LOOSE STOOLS: Primary | ICD-10-CM

## 2022-07-16 LAB
ALBUMIN SERPL-MCNC: 4.4 G/DL (ref 3.4–5)
ALBUMIN UR-MCNC: NEGATIVE MG/DL
ALP SERPL-CCNC: 79 U/L (ref 40–150)
ALT SERPL W P-5'-P-CCNC: 32 U/L (ref 0–70)
ANION GAP SERPL CALCULATED.3IONS-SCNC: 5 MMOL/L (ref 3–14)
APPEARANCE UR: CLEAR
AST SERPL W P-5'-P-CCNC: 20 U/L (ref 0–45)
BASOPHILS # BLD AUTO: 0 10E3/UL (ref 0–0.2)
BASOPHILS NFR BLD AUTO: 1 %
BILIRUB SERPL-MCNC: 1 MG/DL (ref 0.2–1.3)
BILIRUB UR QL STRIP: NEGATIVE
BUN SERPL-MCNC: 12 MG/DL (ref 7–30)
CALCIUM SERPL-MCNC: 9.2 MG/DL (ref 8.5–10.1)
CHLORIDE BLD-SCNC: 106 MMOL/L (ref 94–109)
CO2 SERPL-SCNC: 28 MMOL/L (ref 20–32)
COLOR UR AUTO: ABNORMAL
CREAT SERPL-MCNC: 0.87 MG/DL (ref 0.66–1.25)
EOSINOPHIL # BLD AUTO: 0.7 10E3/UL (ref 0–0.7)
EOSINOPHIL NFR BLD AUTO: 9 %
ERYTHROCYTE [DISTWIDTH] IN BLOOD BY AUTOMATED COUNT: 13.4 % (ref 10–15)
GFR SERPL CREATININE-BSD FRML MDRD: >90 ML/MIN/1.73M2
GLUCOSE BLD-MCNC: 95 MG/DL (ref 70–99)
GLUCOSE UR STRIP-MCNC: NEGATIVE MG/DL
HCT VFR BLD AUTO: 50.5 % (ref 40–53)
HGB BLD-MCNC: 15.7 G/DL (ref 13.3–17.7)
HGB UR QL STRIP: NEGATIVE
IMM GRANULOCYTES # BLD: 0 10E3/UL
IMM GRANULOCYTES NFR BLD: 0 %
KETONES UR STRIP-MCNC: NEGATIVE MG/DL
LEUKOCYTE ESTERASE UR QL STRIP: NEGATIVE
LIPASE SERPL-CCNC: 83 U/L (ref 73–393)
LYMPHOCYTES # BLD AUTO: 1.4 10E3/UL (ref 0.8–5.3)
LYMPHOCYTES NFR BLD AUTO: 19 %
MCH RBC QN AUTO: 27.5 PG (ref 26.5–33)
MCHC RBC AUTO-ENTMCNC: 31.1 G/DL (ref 31.5–36.5)
MCV RBC AUTO: 88 FL (ref 78–100)
MONOCYTES # BLD AUTO: 0.6 10E3/UL (ref 0–1.3)
MONOCYTES NFR BLD AUTO: 9 %
NEUTROPHILS # BLD AUTO: 4.6 10E3/UL (ref 1.6–8.3)
NEUTROPHILS NFR BLD AUTO: 62 %
NITRATE UR QL: NEGATIVE
NRBC # BLD AUTO: 0 10E3/UL
NRBC BLD AUTO-RTO: 0 /100
PH UR STRIP: 7.5 [PH] (ref 5–7)
PLATELET # BLD AUTO: 218 10E3/UL (ref 150–450)
POTASSIUM BLD-SCNC: 4.1 MMOL/L (ref 3.4–5.3)
PROT SERPL-MCNC: 7.3 G/DL (ref 6.8–8.8)
RBC # BLD AUTO: 5.71 10E6/UL (ref 4.4–5.9)
RBC URINE: 0 /HPF
SODIUM SERPL-SCNC: 139 MMOL/L (ref 133–144)
SP GR UR STRIP: 1.03 (ref 1–1.03)
UROBILINOGEN UR STRIP-MCNC: NORMAL MG/DL
WBC # BLD AUTO: 7.4 10E3/UL (ref 4–11)
WBC URINE: 0 /HPF

## 2022-07-16 PROCEDURE — 36415 COLL VENOUS BLD VENIPUNCTURE: CPT | Performed by: EMERGENCY MEDICINE

## 2022-07-16 PROCEDURE — 250N000011 HC RX IP 250 OP 636: Performed by: EMERGENCY MEDICINE

## 2022-07-16 PROCEDURE — 83690 ASSAY OF LIPASE: CPT | Performed by: EMERGENCY MEDICINE

## 2022-07-16 PROCEDURE — 99207 PR NO CHARGE LOS: CPT | Performed by: FAMILY MEDICINE

## 2022-07-16 PROCEDURE — 99285 EMERGENCY DEPT VISIT HI MDM: CPT | Mod: 25

## 2022-07-16 PROCEDURE — 74177 CT ABD & PELVIS W/CONTRAST: CPT

## 2022-07-16 PROCEDURE — 81001 URINALYSIS AUTO W/SCOPE: CPT | Performed by: EMERGENCY MEDICINE

## 2022-07-16 PROCEDURE — 85025 COMPLETE CBC W/AUTO DIFF WBC: CPT | Performed by: EMERGENCY MEDICINE

## 2022-07-16 PROCEDURE — 80053 COMPREHEN METABOLIC PANEL: CPT | Performed by: EMERGENCY MEDICINE

## 2022-07-16 RX ORDER — IOPAMIDOL 755 MG/ML
75 INJECTION, SOLUTION INTRAVASCULAR ONCE
Status: COMPLETED | OUTPATIENT
Start: 2022-07-16 | End: 2022-07-16

## 2022-07-16 RX ADMIN — IOPAMIDOL 75 ML: 755 INJECTION, SOLUTION INTRAVENOUS at 10:58

## 2022-07-16 NOTE — PROGRESS NOTES
THIS IS A TRIAGE ENCOUNTER.      Dominic Gibbs is a 31 year old male presenting with a chief complaint of five days of suddent-onset pain at the bilateral lower back (waxes and wanes, dull and achy), left lower abdomen, upper midline of the abdomen (dull and achy, waxe and wanes), bloating sensation, decreased appetite, loose non-bloody stools, early satiety.  In the mornings, patient has an urgent need to defecate and loose stools.  The stools have not been beige/white.  He has had both constipation and loose stools in the same 24-hour period.      Given the red flag signs such as early satiety and urgent morning defecation, patient will go to an emergency room for further evaluation. I told the patient that I would not charge for this brief triage evaluation.     Rommel Weiner MD

## 2022-07-16 NOTE — ED TRIAGE NOTES
Pt presents with abdominal pain that is generalized since Monday or Tuesday. Was seen at urgent care and referred to ER. Diarrhea/bloating present for same amount of time.     Triage Assessment     Row Name 07/16/22 1005       Triage Assessment (Adult)    Airway WDL WDL       Cognitive/Neuro/Behavioral WDL    Cognitive/Neuro/Behavioral WDL WDL

## 2022-07-16 NOTE — ED PROVIDER NOTES
History     Chief Complaint:  Abdominal Pain      HPI   Dominic Gibbs is a 31 year old male who presents with abdominal pain.  Patient reports onset of abdominal bloating and left lower quadrant discomfort for about 5 days prior to presentation.  He reports that the diarrhea, waxes and wanes.  Sometimes very loose and is more normal.  No blood noted in the stools.  No fevers, chills, nausea or vomiting.  He reports some left lower quadrant discomfort.  Not severe enough to take any medications for.  He reports early satiety.  Generally feels like his energy is normal otherwise.  Denies any recent antibiotics in the last month.  No recent trips or travels.    Review of Systems   All other systems reviewed and are negative.      Allergies:  Norcuron      Medications:    aspirin 81 MG EC tablet  atenolol (TENORMIN) 25 MG tablet  MULTIPLE VITAMINS-MINERALS ER PO        Past Medical History:      Past Medical History:   Diagnosis Date     Benign essential tremor      Pulmonary stenosis      RBBB      Reactive airway disease      Scoliosis      Tetralogy of Fallot s/p repair      Patient Active Problem List    Diagnosis Date Noted     Tetralogy of Fallot s/p repair 10/29/2013     Priority: Medium        Past Surgical History:      Past Surgical History:   Procedure Laterality Date      Right ventricle to pulmonary artery outflow patch.  12/1992     BACK SURGERY - Jose placement for scoliosis  2009     Multiple heart catheterizations  1992, 1993, 1997, 8/2012     Right ventricle to pulmonary artery conduit change (with 18 mm Chavez Dacron porcine valved conduit), suture closure of residual VSD  06/2006     Status post 18 mm pulmonary homograft in the right ventricular outflow tract with VSD closure.  04/1994     VASECTOMY  04/04/2022       Family History:      No family history on file.    Social History:  Clinic, [facility]  The patient presents to the ED alone    Physical Exam     Patient Vitals for the  past 24 hrs:   BP Temp Temp src Pulse Resp SpO2   07/16/22 1006 134/71 97.6  F (36.4  C) Temporal 52 18 100 %       Physical Exam  General: Resting on the bed.  Head: No obvious trauma to head.  Ears, Nose, Throat:  External ears normal.  Nose normal.    Eyes:  Conjunctivae clear.  Pupils are equal, round, and reactive.   Neck: Normal range of motion.  Neck supple.   CV: Regular rate and rhythm.  + murmurs.      Respiratory: Effort normal and breath sounds normal.  No wheezing or crackles.   Gastrointestinal: Soft.  No distension. There is LLQ tenderness.  There is no rigidity, no rebound and no guarding.   Musculoskeletal: No CVA tenderness   Neuro: Alert. Moving all extremities appropriately.  Normal speech.    Skin: Skin is warm and dry.  No rash noted.       Emergency Department Course     Imaging:  CT Abdomen Pelvis w Contrast   Preliminary Result   IMPRESSION:    1.  No acute abnormality is seen.   2.  Cholelithiasis.   3.  Enhancing nodules in the right liver are nonspecific. Recommend correlation with nonurgent liver MRI.           Report per radiology    Laboratory:  Labs Ordered and Resulted from Time of ED Arrival to Time of ED Departure   CBC WITH PLATELETS AND DIFFERENTIAL - Abnormal       Result Value    WBC Count 7.4      RBC Count 5.71      Hemoglobin 15.7      Hematocrit 50.5      MCV 88      MCH 27.5      MCHC 31.1 (*)     RDW 13.4      Platelet Count 218      % Neutrophils 62      % Lymphocytes 19      % Monocytes 9      % Eosinophils 9      % Basophils 1      % Immature Granulocytes 0      NRBCs per 100 WBC 0      Absolute Neutrophils 4.6      Absolute Lymphocytes 1.4      Absolute Monocytes 0.6      Absolute Eosinophils 0.7      Absolute Basophils 0.0      Absolute Immature Granulocytes 0.0      Absolute NRBCs 0.0     COMPREHENSIVE METABOLIC PANEL - Normal    Sodium 139      Potassium 4.1      Chloride 106      Carbon Dioxide (CO2) 28      Anion Gap 5      Urea Nitrogen 12      Creatinine 0.87       Calcium 9.2      Glucose 95      Alkaline Phosphatase 79      AST 20      ALT 32      Protein Total 7.3      Albumin 4.4      Bilirubin Total 1.0      GFR Estimate >90     LIPASE - Normal    Lipase 83     ROUTINE UA WITH MICROSCOPIC REFLEX TO CULTURE   ENTERIC BACTERIA AND VIRUS PANEL BY JEVON STOOL   CLOSTRIDIUM DIFFICILE TOXIN B       Emergency Department Course:             Reviewed:  I reviewed nursing notes, vitals, past medical history and Care Everywhere    Assessments:  1015 I obtained history and examined the patient as noted above.    I rechecked the patient and explained findings.       Interventions:  Medications   iopamidol (ISOVUE-370) solution 75 mL (75 mLs Intravenous Given 22 1058)   sodium chloride (PF) 0.9% PF flush 60 mL (60 mLs Intravenous Given 22 1100)       Disposition:  The patient was discharged to home.     Impression & Plan      Medical Decision Makin-year-old male presents with abdominal pain and diarrhea.  Broad differential was pursued include not limited to diverticulitis, colitis, obstruction, bacterial diarrhea, electrolyte, metabolic, renal dysfunction, pancreatitis, hepatitis, appendicitis, tender.  Overall patient is well-appearing nontoxic.  CBC shows no leukocytosis or anemia.  BMP shows no acute electrolyte, metabolic or renal dysfunction.  LFTs and lipase are reassuring, not concerning for obstructive biliary process, hepatitis, pancreatitis.  No right upper quadrant tenderness to indicate cholecystitis.  No right lower quadrant tenderness to indicate appendicitis.  Patient does have some left lower quadrant tenderness and given concern for diarrhea and pain opted to obtain CT scan.  CT shows no evidence of acute surgical abnormality or etiology of patient's pain.  There is noted to be cholelithiasis but patient has no right upper quadrant tenderness to indicate that being the etiology of patient's discomfort.  There is enhancing nodules in the liver that  patient was encouraged to get outpatient MRI for.  Stool cultures were ordered patient unable to give sample in the ER.  UA shows no signs of infection or blood.  Recommend outpatient stool cultures.  Supportive cares.  Good oral hydration.  BRAT diet.  Close follow-up with PCP.  Return precautions were provided and patient was discharged home.      Covid-19  Dominic Gibbs was evaluated during a global COVID-19 pandemic, which necessitated consideration that the patient might be at risk for infection with the SARS-CoV-2 virus that causes COVID-19.   Applicable protocols for evaluation were followed during the patient's care.   COVID-19 was considered as part of the patient's evaluation.    Diagnosis:    ICD-10-CM    1. Abdominal pain, left lower quadrant  R10.32    2. Diarrhea, unspecified type  R19.7        Discharge Medications:  New Prescriptions    No medications on file          Angeline Capps MD  07/16/22 0569

## 2022-07-16 NOTE — DISCHARGE INSTRUCTIONS
Return to the ED if you are unable to tolerate fluids, intractable nausea or vomiting, severe abdominal pain, fevers >101 or other acute changes.  Please follow up with your PCP in 2-3 days.      Your CT scan does show some areas of the liver that need a nonemergent MRI follow-up by your primary care doctor.      Discharge Instructions  Adult Diarrhea    You have been seen today for diarrhea (loose stools). This is usually caused by a virus, but some bacteria, parasites, medicines, or other medical conditions can cause similar symptoms. At this time your provider does not find that your diarrhea is a sign of anything dangerous or life-threatening. However, sometimes the signs of serious illness do not show up right away. If you have new or worse symptoms, you may need to be seen again in the Emergency Department or by your primary provider.     Generally, every Emergency Department visit should have a follow-up clinic visit with either a primary or a specialty clinic/provider. Please follow-up as instructed by your emergency provider today.    Return to the Emergency Department if:  You feel you are getting dehydrated, such as being very thirsty, not urinating (peeing) like usual, or feeling faint or lightheaded.   You develop a new fever.  You have abdominal (belly) pain that seems worse than cramps, is in one spot, or is getting worse over time.   You have blood in your stool or your stool becomes black.  (Remember that if you take Pepto-Bismol , this will turn your stool black).   You feel very weak.    What can I do to help myself?  The most important thing to do is to drink clear liquids.   It is best to have only small, frequent sips of liquids. Drinking too much at once may cause more diarrhea. You should also replace minerals, sodium and potassium lost with diarrhea. Pedialyte  and sports drinks can help you replace these minerals. You can also drink clear liquids such as water, weak tea, apple juice, and  "7-Up . Avoid acidic liquids (orange juice), caffeine (coffee) or alcohol. Milk products will make the diarrhea worse.  Eat bland (plain) foods. Soda crackers, toast, plain noodles, gelatin, applesauce and bananas are good first choices. Avoid foods that have acid, are spicy, fatty or fibrous (such as meats, coarse grains, vegetables). You may start eating these foods again in about 3 days when you are better.   Sometimes treatment includes prescription medicine to prevent diarrhea. If your provider prescribes these for you, take them as directed.   Nonprescription medicine is available for the treatment of diarrhea and can be very effective. If you use it, make sure you use the dose recommended on the package. Check with your healthcare provider before you use any medicine for diarrhea.   Do not take ibuprofen, or other nonsteroidal anti-inflammatory medicines, without checking with your healthcare provider.   Probiotics: If you have been given an antibiotic, you may want to also take a probiotic pill or eat yogurt with live cultures. Probiotics have \"good bacteria\" to help your intestines stay healthy. Studies have shown that probiotics help prevent diarrhea and other intestine problems (including C. diff infection) when you take antibiotics. You can buy these without a prescription in the pharmacy section of the store.   If you were given a prescription for medicine here today, be sure to read all of the information (including the package insert) that comes with your prescription.  This will include important information about the medicine, its side effects, and any warnings that you need to know about.  The pharmacist who fills the prescription can provide more information and answer questions you may have about the medicine.  If you have questions or concerns that the pharmacist cannot address, please call or return to the Emergency Department.  Remember that you can always come back to the Emergency Department " if you are not able to see your regular provider in the amount of time listed above, if you get any new symptoms, or if there is anything that worries you.  Discharge Instructions  Abdominal Pain    Abdominal pain (belly pain) can be caused by many things. Your evaluation today does not show the exact cause for your pain. Your provider today has decided that it is unlikely your pain is due to a life threatening problem, or a problem requiring surgery or hospital admission. Sometimes those problems cannot be found right away, so it is very important that you follow up as directed.  Sometimes only the changes which occur over time allow the cause of your pain to be found.    Generally, every Emergency Department visit should have a follow-up clinic visit with either a primary or a specialty clinic/provider. Please follow-up as instructed by your emergency provider today. With abdominal pain, we often recommend very close follow-up, such as the following day.    ADULTS:  Return to the Emergency Department right away if:    You get an oral temperature above 102oF or as directed by your provider.  You have blood in your stools. This may be bright red or appear as black, tarry stools.    You keep vomiting (throwing up) or cannot drink liquids.  You see blood when you vomit.   You cannot have a bowel movement or you cannot pass gas.  Your stomach gets bloated or bigger.  Your skin or the whites of your eyes look yellow.  You faint.  You have bloody, frequent or painful urination (peeing).  You have new symptoms or anything that worries you.    CHILDREN:  Return to the Emergency Department right away if your child has any of the above-listed symptoms or the following:    Pushes your hand away or screams/cries when his/her belly is touched.  You notice your child is very fussy or weak.  Your child is very tired and is too tired to eat or drink.  Your child is dehydrated.  Signs of dehydration can be:  Significant change in  the amount of wet diapers/urine.  Your infant or child starts to have dry mouth and lips, or no saliva (spit) or tears.    PREGNANT WOMEN:  Return to the Emergency Department right away if you have any of the above-listed symptoms or the following:    You have bleeding, leaking fluid or passing tissue from the vagina.  You have worse pain or cramping, or pain in your shoulder or back.  You have vomiting that will not stop.  You have a temperature of 100oF or more.  Your baby is not moving as much as usual.  You faint.  You get a bad headache with or without eye problems and abdominal pain.  You have a seizure.  You have unusual discharge from your vagina and abdominal pain.    Abdominal pain is pretty common during pregnancy.  Your pain may or may not be related to your pregnancy. You should follow-up closely with your OB provider so they can evaluate you and your baby.  Until you follow-up with your regular provider, do the following:     Avoid sex and do not put anything in your vagina.  Drink clear fluids.  Only take medications approved by your provider.    MORE INFORMATION:    Appendicitis:  A possible cause of abdominal pain in any person who still has their appendix is acute appendicitis. Appendicitis is often hard to diagnose.  Testing does not always rule out early appendicitis or other causes of abdominal pain. Close follow-up with your provider and re-evaluations may be needed to figure out the reason for your abdominal pain.    Follow-up:  It is very important that you make an appointment with your clinic and go to the appointment.  If you do not follow-up with your primary provider, it may result in missing an important development which could result in permanent injury or disability and/or lasting pain.  If there is any problem keeping your appointment, call your provider or return to the Emergency Department.    Medications:  Take your medications as directed by your provider today.  Before using  "over-the-counter medications, ask your provider and make sure to take the medications as directed.  If you have any questions about medications, ask your provider.    Diet:  Resume your normal diet as much as possible, but do not eat fried, fatty or spicy foods while you have pain.  Do not drink alcohol or have caffeine.  Do not smoke tobacco.    Probiotics: If you have been given an antibiotic, you may want to also take a probiotic pill or eat yogurt with live cultures. Probiotics have \"good bacteria\" to help your intestines stay healthy. Studies have shown that probiotics help prevent diarrhea (loose stools) and other intestine problems (including C. diff infection) when you take antibiotics. You can buy these without a prescription in the pharmacy section of the store.     If you were given a prescription for medicine here today, be sure to read all of the information (including the package insert) that comes with your prescription.  This will include important information about the medicine, its side effects, and any warnings that you need to know about.  The pharmacist who fills the prescription can provide more information and answer questions you may have about the medicine.  If you have questions or concerns that the pharmacist cannot address, please call or return to the Emergency Department.       Remember that you can always come back to the Emergency Department if you are not able to see your regular provider in the amount of time listed above, if you get any new symptoms, or if there is anything that worries you.    "

## 2022-07-18 ENCOUNTER — TELEPHONE (OUTPATIENT)
Dept: UROLOGY | Facility: CLINIC | Age: 32
End: 2022-07-18

## 2022-07-18 ENCOUNTER — LAB (OUTPATIENT)
Dept: LAB | Facility: CLINIC | Age: 32
End: 2022-07-18
Payer: COMMERCIAL

## 2022-07-18 DIAGNOSIS — Z30.2 ENCOUNTER FOR STERILIZATION: Primary | ICD-10-CM

## 2022-07-18 DIAGNOSIS — R19.7 DIARRHEA, UNSPECIFIED TYPE: ICD-10-CM

## 2022-07-18 LAB — C DIFF TOX B STL QL: POSITIVE

## 2022-07-18 PROCEDURE — 87506 IADNA-DNA/RNA PROBE TQ 6-11: CPT

## 2022-07-18 PROCEDURE — 87493 C DIFF AMPLIFIED PROBE: CPT | Mod: XU

## 2022-07-19 ENCOUNTER — TELEPHONE (OUTPATIENT)
Dept: EMERGENCY MEDICINE | Facility: CLINIC | Age: 32
End: 2022-07-19

## 2022-07-19 DIAGNOSIS — A04.72 COLITIS DUE TO CLOSTRIDIUM DIFFICILE: ICD-10-CM

## 2022-07-19 RX ORDER — VANCOMYCIN HYDROCHLORIDE 125 MG/1
125 CAPSULE ORAL 4 TIMES DAILY
Qty: 40 CAPSULE | Refills: 0 | Status: SHIPPED | OUTPATIENT
Start: 2022-07-19 | End: 2022-07-29

## 2022-07-19 NOTE — TELEPHONE ENCOUNTER
[facility] Emergency Department Lab result notification    [facility] ED lab result protocol used  Clostridium difficile     Reason for call  Notify of lab results, assess symptoms,  review ED providers recommendations/discharge instructions (if necessary) and advise per ED lab result f/u protocol    Lab Result (including Rx patient on, if applicable)  Final Clostridium difficile toxin B PCR is POSITIVE.    [facility] Emergency Dept discharge antibiotic: None  Recommendations in treatment per [facility] ED Lab result Clostridium difficile protocol.     Information table from Emergency Dept Provider visit on 7/16/22  Symptoms reported at ED visit (Chief complaint, HPI) Abdominal Pain        HPI   Dominic Gibbs is a 31 year old male who presents with abdominal pain.  Patient reports onset of abdominal bloating and left lower quadrant discomfort for about 5 days prior to presentation.  He reports that the diarrhea, waxes and wanes.  Sometimes very loose and is more normal.  No blood noted in the stools.  No fevers, chills, nausea or vomiting.  He reports some left lower quadrant discomfort.  Not severe enough to take any medications for.  He reports early satiety.  Generally feels like his energy is normal otherwise.  Denies any recent antibiotics in the last month.  No recent trips or travels.   Significant Medical hx, if applicable (i.e. CKD, diabetes) Reviewed    Allergies Allergies   Allergen Reactions     Norcuron Anaphylaxis      Weight, if applicable Wt Readings from Last 2 Encounters:   04/13/22 72.6 kg (160 lb)   04/04/22 72.6 kg (160 lb)      Coumadin/Warfarin [Yes /No] No   Creatinine Level (mg/dl) Creatinine   Date Value Ref Range Status   07/16/2022 0.87 0.66 - 1.25 mg/dL Final   09/17/2018 1.05 0.66 - 1.25 mg/dL Final      Creatinine clearance (ml/min), if applicable Serum creatinine: 0.87 mg/dL 07/16/22 1039  Estimated creatinine clearance: 128.6 mL/min   ED providers  Impression and Plan (applicable information) 31-year-old male presents with abdominal pain and diarrhea.  Broad differential was pursued include not limited to diverticulitis, colitis, obstruction, bacterial diarrhea, electrolyte, metabolic, renal dysfunction, pancreatitis, hepatitis, appendicitis, tender.  Overall patient is well-appearing nontoxic.  CBC shows no leukocytosis or anemia.  BMP shows no acute electrolyte, metabolic or renal dysfunction.  LFTs and lipase are reassuring, not concerning for obstructive biliary process, hepatitis, pancreatitis.  No right upper quadrant tenderness to indicate cholecystitis.  No right lower quadrant tenderness to indicate appendicitis.  Patient does have some left lower quadrant tenderness and given concern for diarrhea and pain opted to obtain CT scan.  CT shows no evidence of acute surgical abnormality or etiology of patient's pain.  There is noted to be cholelithiasis but patient has no right upper quadrant tenderness to indicate that being the etiology of patient's discomfort.  There is enhancing nodules in the liver that patient was encouraged to get outpatient MRI for.  Stool cultures were ordered patient unable to give sample in the ER.  UA shows no signs of infection or blood.  Recommend outpatient stool cultures.  Supportive cares.  Good oral hydration.  BRAT diet.  Close follow-up with PCP.  Return precautions were provided and patient was discharged home.        Covid-19  Dominic iGbbs was evaluated during a global COVID-19 pandemic, which necessitated consideration that the patient might be at risk for infection with the SARS-CoV-2 virus that causes COVID-19.   Applicable protocols for evaluation were followed during the patient's care.   COVID-19 was considered as part of the patient's evaluation.   ED diagnosis 1. Abdominal pain, left lower quadrant  R10.32     2. Diarrhea, unspecified type           ED provider Angeline Capps MD       RN Assessment  "(Patient s current Symptoms), include time called.  [Insert Left message here if message left]  9;22AM: Patient states he has intermittent abdominal pain, improved since his ED visit. Diarrhea is less, still \"loose and some formed\", no blood noted. Has been eating a bland diet. No nausea or vomiting. Taking in fluids well and urinating in good amounts. States he was on antibiotics twice in the past 2 months. Saw his PCP yesterday sees an immunologist. Denies any fever.     RN Recommendations/Instructions per [facility] ED lab result protocol  Patient notified of lab result and treatment recommendations.  Rx for Vancomycin (VANCOCIN HCL) 125 MG capsule, 1 capsule (125 mg) by mouth 4 times daily for 10 days sent to [Pharmacy - [facility] [address] in [place]].  RN reviewed information about Clostridium difficile from protocol patient education and Epic reference.  Also sent patient MyChart information: About C. diff Infection  For Patients, Family and Visitors  What is C. diff (clostridium difficile)?    Advised to increase fluids.  Return to ED with any worsening symptoms: worse diarrhea, worse abdominal pain, no urine in 8 hours, very weak/tired, pale skin.   Advised to wash hands well and often with soap and water.   Clean commonly touched surfaces with a chlorine bleach product. Wash clothes, sheets, bedding, towels separately with hot water, detergent and chlorine bleach.   Advised to not take any anti-diarrheals such as Imodium.     Advised to eat more foods with probiotics in them, discuss taking a probiotic with the pharmacist today.     Advised to contact both his PCP and immunologist today to discuss his result and follow up plan.  The patient is comfortable with the information given and has no further questions.     Please Contact your PCP clinic or return to the Emergency department if your:    Symptoms worsen or other concerning symptom's.    PCP follow-up Questions asked: YES       Lucina Moseley, " EDUARDO LY [facility]  Emergency Dept Lab Result RN  Ph# [phone]     Copy of Lab result   Clostridium difficile toxin B PCR  Order: [id number]   Status: Final result     Visible to patient: Yes (seen)     Dx: Diarrhea, unspecified type    Specimen Information: Per Rectum; Stool         1 Result Note     Component Ref Range & Units 1 d ago     C Difficile Toxin B by PCR Negative Positive Abnormal     Comment: Detection of C. difficile nucleic acid in stools confirms the presence of these organisms in diarrheal patients but may not indicate that C. difficile are the etiologic agents of the diarrhea. Results from the Xpert C. difficile assay should be interpreted in conjunction with other laboratory and clinical data available to the clinician.   Resulting Agency  [facility]             Narrative  Performed by: JOHN PAUL  The [employer] Xpert C. difficile Assay, performed on the [employer]  [employer]ument Systems, is a qualitative in vitro diagnostic test for rapid detection of toxin B gene sequences from unformed (liquid or soft) stool specimens collected from patients suspected of having Clostridioides difficile infection (CDI). The test utilizes automated real-time polymerase chain reaction (PCR) to detect toxin gene sequences associated with toxin producing C. difficile. The Xpert C. difficile Assay is intended as an aid in the diagnosis of CDI.      Specimen Collected: 07/18/22  8:00 AM Last Resulted: 07/18/22  6:22 PM

## 2022-07-19 NOTE — RESULT ENCOUNTER NOTE
Final Enteric Bacteria and Virus Panel by JEVON Stool is NEGATIVE for all tested organisms (bacteria/virus).  No treatment or change in treatment per [facility] Lab Result Enteric Bacteria and Virus Panel protocol.

## 2022-10-05 ENCOUNTER — LAB (OUTPATIENT)
Dept: LAB | Facility: CLINIC | Age: 32
End: 2022-10-05
Payer: COMMERCIAL

## 2022-10-05 DIAGNOSIS — Z30.2 ENCOUNTER FOR STERILIZATION: Primary | ICD-10-CM

## 2022-10-05 LAB
ABSTINENCE DAYS: 2 DAYS (ref 2–7)
AGGLUTINATION: NORMAL
ANALYSIS TEMP - CENTIGRADE: 24 CENTIGRADE
COLLECTION METHOD: NORMAL
COLLECTION SITE: NORMAL
CONSENT TO RELEASE TO PARTNER: NO
DAL- RECEIVED TIME: NORMAL
LIQUEFIED: YES
ROUND CELLS: 0 MILLION/ML
SPECIMEN PH: 7.2 PH
SPECIMEN VOLUME: 3.3 ML
SPERM CONCENTRATION: 0 MILLION/ML
TIME OF ANALYSIS: NORMAL
TOTAL SPERM NUMBER: 0 MILLION
VISCOUS: NO

## 2022-10-05 PROCEDURE — 89321 SEMEN ANAL SPERM DETECTION: CPT

## 2023-02-17 ENCOUNTER — TELEPHONE (OUTPATIENT)
Dept: UROLOGY | Facility: CLINIC | Age: 33
End: 2023-02-17
Payer: COMMERCIAL

## 2023-02-17 DIAGNOSIS — N50.89 LUMP IN TESTIS: Primary | ICD-10-CM

## 2023-02-17 NOTE — TELEPHONE ENCOUNTER
[facility] Call Center    Phone Message    May a detailed message be left on voicemail: yes     Reason for Call: Order(s): Other:   Reason for requested: Scotal Ultrasound  Provider name: Constantin Maria    Please call pt with any questions or once orders placed. Thank you    Action Taken: Message routed to:  Other: Uro    Travel Screening: Not Applicable

## 2023-02-28 ENCOUNTER — ANCILLARY PROCEDURE (OUTPATIENT)
Dept: ULTRASOUND IMAGING | Facility: CLINIC | Age: 33
End: 2023-02-28
Attending: UROLOGY
Payer: COMMERCIAL

## 2023-02-28 DIAGNOSIS — N50.89 LUMP IN TESTIS: ICD-10-CM

## 2023-02-28 PROCEDURE — 76870 US EXAM SCROTUM: CPT

## 2023-03-26 ENCOUNTER — HEALTH MAINTENANCE LETTER (OUTPATIENT)
Age: 33
End: 2023-03-26

## 2024-05-26 ENCOUNTER — HEALTH MAINTENANCE LETTER (OUTPATIENT)
Age: 34
End: 2024-05-26

## 2025-06-14 ENCOUNTER — HEALTH MAINTENANCE LETTER (OUTPATIENT)
Age: 35
End: 2025-06-14